# Patient Record
Sex: FEMALE | Race: WHITE | NOT HISPANIC OR LATINO | Employment: FULL TIME | URBAN - METROPOLITAN AREA
[De-identification: names, ages, dates, MRNs, and addresses within clinical notes are randomized per-mention and may not be internally consistent; named-entity substitution may affect disease eponyms.]

---

## 2017-02-10 ENCOUNTER — OFFICE VISIT (OUTPATIENT)
Dept: OPHTHALMOLOGY | Facility: CLINIC | Age: 52
End: 2017-02-10
Payer: COMMERCIAL

## 2017-02-10 DIAGNOSIS — H43.811 POSTERIOR VITREOUS DETACHMENT OF RIGHT EYE: ICD-10-CM

## 2017-02-10 DIAGNOSIS — Z01.01 ENCOUNTER FOR EXAMINATION OF EYES AND VISION WITH ABNORMAL FINDINGS: Primary | ICD-10-CM

## 2017-02-10 DIAGNOSIS — H52.4 PRESBYOPIA: ICD-10-CM

## 2017-02-10 PROBLEM — H04.122 DRY EYE OF LEFT SIDE: Status: ACTIVE | Noted: 2017-02-10

## 2017-02-10 PROCEDURE — 92004 COMPRE OPH EXAM NEW PT 1/>: CPT | Performed by: STUDENT IN AN ORGANIZED HEALTH CARE EDUCATION/TRAINING PROGRAM

## 2017-02-10 PROCEDURE — 92015 DETERMINE REFRACTIVE STATE: CPT | Performed by: STUDENT IN AN ORGANIZED HEALTH CARE EDUCATION/TRAINING PROGRAM

## 2017-02-10 ASSESSMENT — EXTERNAL EXAM - RIGHT EYE: OD_EXAM: NORMAL

## 2017-02-10 ASSESSMENT — REFRACTION_WEARINGRX
OD_SPHERE: -1.25
OS_CYLINDER: +1.25
SPECS_TYPE: SVL-DISTANCE
OD_AXIS: 180
OD_CYLINDER: +1.25
OS_SPHERE: -1.25
OS_AXIS: 180

## 2017-02-10 ASSESSMENT — CUP TO DISC RATIO
OD_RATIO: 0.1
OS_RATIO: 0.1

## 2017-02-10 ASSESSMENT — REFRACTION_MANIFEST
OS_SPHERE: -1.50
OD_CYLINDER: +1.25
OD_ADD: +1.50
OD_SPHERE: -1.50
OS_CYLINDER: +1.25
OS_AXIS: 005
OD_AXIS: 002
OS_ADD: +1.50

## 2017-02-10 ASSESSMENT — VISUAL ACUITY
OS_CC: 20/20
OD_CC: 20/20
METHOD: SNELLEN - LINEAR
CORRECTION_TYPE: GLASSES

## 2017-02-10 ASSESSMENT — TONOMETRY
IOP_METHOD: APPLANATION
OD_IOP_MMHG: 18
OS_IOP_MMHG: 18

## 2017-02-10 ASSESSMENT — SLIT LAMP EXAM - LIDS
COMMENTS: NORMAL
COMMENTS: NORMAL

## 2017-02-10 ASSESSMENT — CONF VISUAL FIELD
OS_NORMAL: 1
OD_NORMAL: 1

## 2017-02-10 ASSESSMENT — EXTERNAL EXAM - LEFT EYE: OS_EXAM: NORMAL

## 2017-02-10 NOTE — PROGRESS NOTES
Current Eye Medications:  None     Subjective:  Here for complete today.  Last eye exam was 2 years ago, wears distance glasses without bifocal.  Glasses are broken, has 1 large snouzer dog with 3 puppies.  Thinks she may be in need of bifocal, harder to read recently.      Objective:  See Ophthalmology Exam.       Assessment:  Kalani Sherwood is a 51 year old female who presents with:   Encounter Diagnoses   Name Primary?     Posterior vitreous detachment of right eye        Plan:  Glasses prescription given - optional     Melanie Goetz MD  [phone]

## 2017-02-10 NOTE — MR AVS SNAPSHOT
After Visit Summary   2/10/2017    Kalani Sherwood    MRN: [id number]           Patient Information     Date Of Birth          1965        Visit Information        Provider Department      2/10/2017 10:30 AM Melanie Goetz MD [facility]        Today's Diagnoses     Encounter for examination of eyes and vision with abnormal findings    -  1     Presbyopia         Posterior vitreous detachment of right eye         Dry eye of left side           Care Instructions    Glasses prescription given - optional     Melanie Goetz MD  [phone]          Follow-ups after your visit        Follow-up notes from your care team     Return in about 18 months (around 8/10/2018) for Complete Exam.      Who to contact     If you have questions or need follow up information about today's clinic visit or your schedule please contact [facility] directly at [phone].  Normal or non-critical lab and imaging results will be communicated to you by MyChart, letter or phone within 4 business days after the clinic has received the results. If you do not hear from us within 7 days, please contact the clinic through [employer] In[employer] or phone. If you have a critical or abnormal lab result, we will notify you by phone as soon as possible.  Submit refill requests through [employer] or call your pharmacy and they will forward the refill request to us. Please allow 3 business days for your refill to be completed.          Additional Information About Your Visit        MyChart Information     [employer] gives you secure access to your electronic health record. If you see a primary care provider, you can also send messages to your care team and make appointments. If you have questions, please call your primary care clinic.  If you do not have a primary care provider, please call [phone] and they will assist you.        Care EveryWhere ID     This is your Care EveryWhere ID. This could be used by  other organizations to access your [facility] medical records  [id number]         Blood Pressure from Last 3 Encounters:   10/21/16 121/84   06/28/12 150/100   02/24/12 128/80    Weight from Last 3 Encounters:   10/21/16 97.977 kg (216 lb)   06/28/12 88.996 kg (196 lb 3.2 oz)   02/24/12 87.091 kg (192 lb)              We Performed the Following     EYE EXAM (SIMPLE-NONBILLABLE)     REFRACTIVE STATUS        Primary Care Provider Office Phone # Fax #    Galilea Alexis Franco [phone]       [facility] [address]  [facility] [address]        Thank you!     Thank you for choosing [facility]  for your care. Our goal is always to provide you with excellent care. Hearing back from our patients is one way we can continue to improve our services. Please take a few minutes to complete the written survey that you may receive in the mail after your visit with us. Thank you!             Your Updated Medication List - Protect others around you: Learn how to safely use, store and throw away your medicines at www.disposemymeds.org.          This list is accurate as of: 2/10/17 11:39 AM.  Always use your most recent med list.                   Brand Name Dispense Instructions for use    amLODIPine 10 MG tablet    NORVASC    90 tablet    Take 1 tablet (10 mg) by mouth daily       GLUCOSAMINE COMPLEX PO      daily       hydrochlorothiazide 25 MG tablet    HYDRODIURIL    90 tablet    Take 1 tablet (25 mg) by mouth daily       venlafaxine 75 MG tablet    EFFEXOR    180 tablet    Take 1 tablet (75 mg) by mouth 2 times daily

## 2017-07-26 ENCOUNTER — OFFICE VISIT (OUTPATIENT)
Dept: URGENT CARE | Facility: URGENT CARE | Age: 52
End: 2017-07-26
Payer: COMMERCIAL

## 2017-07-26 VITALS
SYSTOLIC BLOOD PRESSURE: 138 MMHG | OXYGEN SATURATION: 95 % | TEMPERATURE: 98.6 F | HEART RATE: 86 BPM | DIASTOLIC BLOOD PRESSURE: 96 MMHG

## 2017-07-26 DIAGNOSIS — S61.411A LACERATION OF RIGHT HAND WITHOUT FOREIGN BODY, INITIAL ENCOUNTER: Primary | ICD-10-CM

## 2017-07-26 PROCEDURE — 12001 RPR S/N/AX/GEN/TRNK 2.5CM/<: CPT | Performed by: PHYSICIAN ASSISTANT

## 2017-07-26 NOTE — MR AVS SNAPSHOT
After Visit Summary   7/26/2017    Kalani Sherwood    MRN: [id number]           Patient Information     Date Of Birth          1965        Visit Information        Provider Department      7/26/2017 8:25 PM Harriet Varela PA-C [facility]        Today's Diagnoses     Laceration of right hand without foreign body, initial encounter    -  1       Follow-ups after your visit        Who to contact     If you have questions or need follow up information about today's clinic visit or your schedule please contact [facility] directly at [phone].  Normal or non-critical lab and imaging results will be communicated to you by [employer], letter or phone within 4 business days after the clinic has received the results. If you do not hear from us within 7 days, please contact the clinic through [employer] or phone. If you have a critical or abnormal lab result, we will notify you by phone as soon as possible.  Submit refill requests through Fara or call your pharmacy and they will forward the refill request to us. Please allow 3 business days for your refill to be completed.          Additional Information About Your Visit        MyChart Information     Fara gives you secure access to your electronic health record. If you see a primary care provider, you can also send messages to your care team and make appointments. If you have questions, please call your primary care clinic.  If you do not have a primary care provider, please call [phone] and they will assist you.        Care EveryWhere ID     This is your Care EveryWhere ID. This could be used by other organizations to access your [facility] medical records  [id number]        Your Vitals Were     Pulse Temperature Last Period Pulse Oximetry          86 98.6  F (37  C) (Oral) 09/12/2016 95%         Blood Pressure from Last 3 Encounters:   07/26/17 (!) 138/96   10/21/16 121/84   06/28/12 150/100     Weight from Last 3 Encounters:   10/21/16 216 lb (98 kg)   06/28/12 196 lb 3.2 oz (89 kg)   02/24/12 192 lb (87.1 kg)              We Performed the Following     CLOSURE SUPERFICIAL, WOUND DEHIS SIMPLE        Primary Care Provider Office Phone # Fax #    Galilea Alexis Franco [phone]       [facility] [address]  [facility] [place] [address]        Equal Access to Services     DANIELE PATHAK : Hadii aad ku hadasho Soomaali, waaxda luqadaha, qaybta kaalmada adeegyada, waxay idiin hayaan adeeg kharash la'pedron . So [facility] [phone].    ATENCIÓN: Si habla español, tiene a knutson disposición servicios gratuitos de asistencia lingüística. [facility] [phone].    We comply with applicable federal civil rights laws and [place] laws. We do not discriminate on the basis of race, color, national origin, age, disability sex, sexual orientation or gender identity.            Thank you!     Thank you for choosing [facility]  for your care. Our goal is always to provide you with excellent care. Hearing back from our patients is one way we can continue to improve our services. Please take a few minutes to complete the written survey that you may receive in the mail after your visit with us. Thank you!             Your Updated Medication List - Protect others around you: Learn how to safely use, store and throw away your medicines at www.disposemymeds.org.          This list is accurate as of: 7/26/17 10:43 PM.  Always use your most recent med list.                   Brand Name Dispense Instructions for use Diagnosis    amLODIPine 10 MG tablet    NORVASC    90 tablet    Take 1 tablet (10 mg) by mouth daily    Essential hypertension with goal blood pressure less than 130/80       GLUCOSAMINE COMPLEX PO      daily        hydrochlorothiazide 25 MG tablet    HYDRODIURIL    90 tablet    Take 1 tablet (25 mg) by mouth daily    Essential hypertension with goal blood pressure less than  130/80       venlafaxine 75 MG tablet    EFFEXOR    180 tablet    Take 1 tablet (75 mg) by mouth 2 times daily    Major depressive disorder, recurrent episode, mild (H)

## 2017-07-27 NOTE — NURSING NOTE
"Chief Complaint   Patient presents with     Laceration     today doing the dishes       Initial BP (!) 138/96 (BP Location: Right arm, Patient Position: Chair, Cuff Size: Adult Regular)  Pulse 86  Temp 98.6  F (37  C) (Oral)  LMP 09/12/2016  SpO2 95% Estimated body mass index is 36.79 kg/(m^2) as calculated from the following:    Height as of 10/21/16: 5' 4.25\" (1.632 m).    Weight as of 10/21/16: 216 lb (98 kg).  Medication Reconciliation: esteban Zuñiga CMA      "

## 2017-07-27 NOTE — PROGRESS NOTES
SUBJECTIVE:                                                    Kalani Sherwood is a 52 year old female who presents to clinic today for the following health issues:      laceration      Duration: today    Description (location/character/radiation): left hand     Intensity:  moderate    Accompanying signs and symptoms: none    History (similar episodes/previous evaluation): None    Precipitating or alleviating factors: None    Therapies tried and outcome: None         SUBJECTIVE:     Chief Complaint   Patient presents with     Laceration     today doing the dishes     Kalani was doing dishes, reached in the sink and it went under a knife which cut her finger.   No concern for a foreign body  No numbness, no weakness in the fingers  This happened less than an hour ago    Last tetanus booster within 10 years: yes    EXAM:   The patient appears today in no apparent distress  VITALS: BP (!) 138/96 (BP Location: Right arm, Patient Position: Chair, Cuff Size: Adult Regular)  Pulse 86  Temp 98.6  F (37  C) (Oral)  LMP 09/12/2016  SpO2 95%    Size of laceration: 2 centimeters  Characteristics of the laceration: slightly irregular edges, clean cut wound  Depth of laceration: subcutaneous tissue  Tendon function intact: yes  Sensation to light touch intact: yes  Pulses/capillary refill intact: yes  Picture included in patient's chart: no    Assessment:  1. Laceration of right hand without foreign body, initial encounter  - CLOSURE SUPERFICIAL, WOUND DEHIS SIMPLE      PLAN:  Tdap administered today: no   Laceration was repaired as described below.   Patient tolerated the procedure well.    After care instructions:  Keep wound clean and dry for the next 24-48 hours  Ok to shower and get wound wet after 48 hours, but do not soak for 2 weeks  Sutures out in 7 days  Signs of infection discussed today  May return to work/school as long as wound is kept clean and dry  Discussed the probability of scarring  Active range of  motion exercises encouraged    PROCEDURE NOTE:  Wound was locally injected with 4 cc's of Lidocaine 1% plain  Prepped and draped in the usual sterile fashion  Wound irrigated with normal saline using a 60cc syringe and irrigation tip for high-pressure irrigation  Wound was explored for any foreign bodies and evaluated for tendon, nerve, vessel or joint involvement.    Laceration was closed using 4 4-0 nylon interrupted sutures  Bandage was applied.     Harriet Varela PA-C

## 2017-09-07 ENCOUNTER — TRANSFERRED RECORDS (OUTPATIENT)
Dept: HEALTH INFORMATION MANAGEMENT | Facility: CLINIC | Age: 52
End: 2017-09-07

## 2017-10-15 ENCOUNTER — HEALTH MAINTENANCE LETTER (OUTPATIENT)
Age: 52
End: 2017-10-15

## 2017-10-16 ENCOUNTER — TELEPHONE (OUTPATIENT)
Dept: FAMILY MEDICINE | Facility: CLINIC | Age: 52
End: 2017-10-16

## 2017-10-16 DIAGNOSIS — F33.0 MAJOR DEPRESSIVE DISORDER, RECURRENT EPISODE, MILD (H): ICD-10-CM

## 2017-10-16 DIAGNOSIS — I10 ESSENTIAL HYPERTENSION WITH GOAL BLOOD PRESSURE LESS THAN 130/80: ICD-10-CM

## 2017-10-16 RX ORDER — VENLAFAXINE 75 MG/1
TABLET ORAL
Qty: 60 TABLET | Refills: 3 | Status: SHIPPED | OUTPATIENT
Start: 2017-10-16 | End: 2023-01-24

## 2017-10-16 RX ORDER — HYDROCHLOROTHIAZIDE 25 MG/1
TABLET ORAL
Qty: 30 TABLET | Refills: 0 | Status: SHIPPED | OUTPATIENT
Start: 2017-10-16 | End: 2017-11-19

## 2017-10-16 RX ORDER — AMLODIPINE BESYLATE 10 MG/1
TABLET ORAL
Qty: 30 TABLET | Refills: 0 | Status: SHIPPED | OUTPATIENT
Start: 2017-10-16 | End: 2017-11-19

## 2017-10-16 NOTE — TELEPHONE ENCOUNTER
Medications are being filled for 1 time refill only due to:  Patient needs to be seen because due for annual visit in 5 days.     Encounter routed to team to reach out to patient to schedule.      Keiko Soto RN  [facility]

## 2017-10-16 NOTE — TELEPHONE ENCOUNTER
venlafaxine (EFFEXOR) 75 MG tablet    Last Written Prescription Date: 10-21-16  Last Fill Quantity: 180, # refills: 3  Last Office Visit with [facility] or [facility] prescribing provider: 10-21-16        BP Readings from Last 3 Encounters:   07/26/17 (!) 138/96   10/21/16 121/84   06/28/12 150/100     Pulse: (for Fetzima)  Creatinine   Date Value Ref Range Status   10/21/2016 0.66 0.52 - 1.04 mg/dL Final   ]    Last PHQ-9 score on record=   PHQ-9 SCORE 10/21/2016   Total Score -   Total Score 2       amLODIPine (NORVASC) 10 MG tablet      Last Written Prescription Date: 10-21-16  Last Fill Quantity: 90, # refills: 3    Last Office Visit with [facility], [facility] or [facility] prescribing provider:  10-21-16   Future Office Visit:        BP Readings from Last 3 Encounters:   07/26/17 (!) 138/96   10/21/16 121/84   06/28/12 150/100     hydrochlorothiazide (HYDRODIURIL) 25 MG tablet      Last Written Prescription Date: 10-21-16  Last Fill Quantity: 90, # refills: 3  Last Office Visit with [facility], [facility] or [facility] prescribing provider: 10-21-16       Potassium   Date Value Ref Range Status   10/21/2016 4.0 3.4 - 5.3 mmol/L Final     Creatinine   Date Value Ref Range Status   10/21/2016 0.66 0.52 - 1.04 mg/dL Final     BP Readings from Last 3 Encounters:   07/26/17 (!) 138/96   10/21/16 121/84   06/28/12 150/100

## 2017-11-19 DIAGNOSIS — I10 ESSENTIAL HYPERTENSION WITH GOAL BLOOD PRESSURE LESS THAN 130/80: ICD-10-CM

## 2017-11-21 NOTE — TELEPHONE ENCOUNTER
HCTZ      Last Written Prescription Date: 10/16/17  Last Fill Quantity: 30,  # refills: 0   Last Office Visit with [facility] or [facility] prescribing provider: 10/21/16    amlodipine      Last Written Prescription Date: 10/16/17  Last Fill Quantity: 30,  # refills: 0   Last Office Visit with [facility], [facility] or [facility] prescribing provider: 10/21/16            There is documentation that TC spoke to patient and advised visit needed, patient stated she would call back to schedule, has not been done      Requested Prescriptions   Pending Prescriptions Disp Refills     hydrochlorothiazide (HYDRODIURIL) 25 MG tablet [Pharmacy Med Name: HYDROCHLOROTHIAZIDE 25 MG TAB] 30 tablet 0     Sig: TAKE 1 TABLET (25 MG) BY MOUTH DAILY    Diuretics (Including Combos) Protocol Failed    11/19/2017  7:26 AM       Failed - Blood pressure under 140/90    BP Readings from Last 3 Encounters:   07/26/17 (!) 138/96   10/21/16 121/84   06/28/12 150/100                Failed - Recent or future visit with authorizing provider's specialty    Patient had office visit in the last year or has a visit in the next 30 days with authorizing provider.  See chart review.              Failed - Normal serum creatinine on file in past 12 months    Recent Labs   Lab Test  10/21/16   0930   CR  0.66             Failed - Normal serum potassium on file in past 12 months    Recent Labs   Lab Test  10/21/16   0930   POTASSIUM  4.0                   Failed - Normal serum sodium on file in past 12 months    Recent Labs   Lab Test  10/21/16   0930   NA  142             Passed - Patient is age 18 or older       Passed - No active pregancy on record       Passed - No positive pregnancy test in past 12 months        amLODIPine (NORVASC) 10 MG tablet [Pharmacy Med Name: AMLODIPINE BESYLATE 10 MG TAB] 30 tablet 0     Sig: TAKE 1 TABLET (10 MG) BY MOUTH DAILY    Calcium Channel Blockers Protocol  Failed    11/19/2017  7:26 AM       Failed - Blood pressure under 140/90    BP  Readings from Last 3 Encounters:   07/26/17 (!) 138/96   10/21/16 121/84   06/28/12 150/100                Failed - Recent or future visit with authorizing provider    Patient had office visit in the last year or has a visit in the next 30 days with authorizing provider.  See chart review.              Failed - Normal serum creatinine on file in past 12 months    Recent Labs   Lab Test  10/21/16   0930   CR  0.66            Passed - Patient is age 18 or older       Passed - No active pregnancy on record       Passed - No positive pregnancy test in past 12 months        Routing refill request to provider for review/approval because:  Ju given x1 and patient did not follow up, please advise    Keiko Soto, JERED  [facility]

## 2017-11-22 RX ORDER — HYDROCHLOROTHIAZIDE 25 MG/1
TABLET ORAL
Qty: 15 TABLET | Refills: 0 | Status: SHIPPED | OUTPATIENT
Start: 2017-11-22 | End: 2023-01-24

## 2017-11-22 RX ORDER — AMLODIPINE BESYLATE 10 MG/1
TABLET ORAL
Qty: 15 TABLET | Refills: 0 | Status: SHIPPED | OUTPATIENT
Start: 2017-11-22 | End: 2023-01-24

## 2017-12-21 ENCOUNTER — TELEPHONE (OUTPATIENT)
Dept: FAMILY MEDICINE | Facility: CLINIC | Age: 52
End: 2017-12-21

## 2017-12-21 NOTE — TELEPHONE ENCOUNTER
12/21/2017    Call Regarding Preventive Health Screening Colonoscopy, Mammogram and Cervical/PAP    Attempt 1    Message on voicemail     Comments:       Outreach   CC

## 2017-12-26 NOTE — TELEPHONE ENCOUNTER
The Patient declined Preventive Health Screens for: Mammogram, Pap smear and colonoscopy.  Please review chart and follow-up with patient if needed.    Thank you        Outreach ,  Arlene Espana

## 2018-01-17 ENCOUNTER — TELEPHONE (OUTPATIENT)
Dept: FAMILY MEDICINE | Facility: CLINIC | Age: 53
End: 2018-01-17

## 2018-01-17 NOTE — TELEPHONE ENCOUNTER
.  Panel Management Review      Patient has the following on her problem list:     Depression / Dysthymia review    Measure:  Needs PHQ-9 score of 4 or less during index window.  Administer PHQ-9 and if score is 5 or more, send encounter to provider for next steps.    5 - 7 month window range:     PHQ-9 SCORE 11/28/2011 2/24/2012 10/21/2016   Total Score 0 1 -   Total Score - - 2       If PHQ-9 recheck is 5 or more, route to provider for next steps.    Patient is due for:  PHQ9 and DAP        Composite cancer screening  Chart review shows that this patient is due/due soon for the following Pap Smear, Mammogram and Colonoscopy  Summary:    Patient is due/failing the following:   COLONOSCOPY, MAMMOGRAM and PAP    Action needed:   Patient needs office visit for pap smear.    Type of outreach:    patien declined-pap smear,colonoscopy and mammogram     Questions for provider review:    None                                                                                                                                    Rissa Leonard MA       Chart routed to Care Team .

## 2020-02-24 ENCOUNTER — HEALTH MAINTENANCE LETTER (OUTPATIENT)
Age: 55
End: 2020-02-24

## 2020-12-13 ENCOUNTER — HEALTH MAINTENANCE LETTER (OUTPATIENT)
Age: 55
End: 2020-12-13

## 2021-04-17 ENCOUNTER — HEALTH MAINTENANCE LETTER (OUTPATIENT)
Age: 56
End: 2021-04-17

## 2021-09-26 ENCOUNTER — HEALTH MAINTENANCE LETTER (OUTPATIENT)
Age: 56
End: 2021-09-26

## 2022-05-08 ENCOUNTER — HEALTH MAINTENANCE LETTER (OUTPATIENT)
Age: 57
End: 2022-05-08

## 2023-01-24 ENCOUNTER — OFFICE VISIT (OUTPATIENT)
Dept: FAMILY MEDICINE | Facility: CLINIC | Age: 58
End: 2023-01-24
Payer: COMMERCIAL

## 2023-01-24 VITALS
BODY MASS INDEX: 32.03 KG/M2 | SYSTOLIC BLOOD PRESSURE: 183 MMHG | WEIGHT: 187.6 LBS | TEMPERATURE: 97.6 F | DIASTOLIC BLOOD PRESSURE: 121 MMHG | RESPIRATION RATE: 16 BRPM | HEIGHT: 64 IN | OXYGEN SATURATION: 97 % | HEART RATE: 72 BPM

## 2023-01-24 DIAGNOSIS — Z13.220 SCREENING FOR HYPERLIPIDEMIA: ICD-10-CM

## 2023-01-24 DIAGNOSIS — Z11.4 SCREENING FOR HIV (HUMAN IMMUNODEFICIENCY VIRUS): ICD-10-CM

## 2023-01-24 DIAGNOSIS — Z12.11 SCREEN FOR COLON CANCER: ICD-10-CM

## 2023-01-24 DIAGNOSIS — Z12.4 CERVICAL CANCER SCREENING: ICD-10-CM

## 2023-01-24 DIAGNOSIS — Z11.59 NEED FOR HEPATITIS C SCREENING TEST: ICD-10-CM

## 2023-01-24 DIAGNOSIS — Z12.31 VISIT FOR SCREENING MAMMOGRAM: ICD-10-CM

## 2023-01-24 DIAGNOSIS — R73.09 ELEVATED GLUCOSE: ICD-10-CM

## 2023-01-24 DIAGNOSIS — Z00.00 ROUTINE GENERAL MEDICAL EXAMINATION AT A HEALTH CARE FACILITY: Primary | ICD-10-CM

## 2023-01-24 DIAGNOSIS — I10 ESSENTIAL HYPERTENSION WITH GOAL BLOOD PRESSURE LESS THAN 130/80: ICD-10-CM

## 2023-01-24 DIAGNOSIS — R32 URINARY INCONTINENCE, UNSPECIFIED TYPE: ICD-10-CM

## 2023-01-24 DIAGNOSIS — F41.9 ANXIETY: ICD-10-CM

## 2023-01-24 DIAGNOSIS — F33.0 MAJOR DEPRESSIVE DISORDER, RECURRENT EPISODE, MILD (H): ICD-10-CM

## 2023-01-24 LAB — HBA1C MFR BLD: 5.5 % (ref 0–5.6)

## 2023-01-24 PROCEDURE — 83036 HEMOGLOBIN GLYCOSYLATED A1C: CPT | Performed by: PHYSICIAN ASSISTANT

## 2023-01-24 PROCEDURE — 99386 PREV VISIT NEW AGE 40-64: CPT | Mod: 25 | Performed by: PHYSICIAN ASSISTANT

## 2023-01-24 PROCEDURE — 87389 HIV-1 AG W/HIV-1&-2 AB AG IA: CPT | Performed by: PHYSICIAN ASSISTANT

## 2023-01-24 PROCEDURE — 90471 IMMUNIZATION ADMIN: CPT | Performed by: PHYSICIAN ASSISTANT

## 2023-01-24 PROCEDURE — 80061 LIPID PANEL: CPT | Performed by: PHYSICIAN ASSISTANT

## 2023-01-24 PROCEDURE — 80048 BASIC METABOLIC PNL TOTAL CA: CPT | Performed by: PHYSICIAN ASSISTANT

## 2023-01-24 PROCEDURE — 36415 COLL VENOUS BLD VENIPUNCTURE: CPT | Performed by: PHYSICIAN ASSISTANT

## 2023-01-24 PROCEDURE — 86803 HEPATITIS C AB TEST: CPT | Performed by: PHYSICIAN ASSISTANT

## 2023-01-24 PROCEDURE — 90682 RIV4 VACC RECOMBINANT DNA IM: CPT | Performed by: PHYSICIAN ASSISTANT

## 2023-01-24 PROCEDURE — 99214 OFFICE O/P EST MOD 30 MIN: CPT | Mod: 25 | Performed by: PHYSICIAN ASSISTANT

## 2023-01-24 RX ORDER — AMLODIPINE BESYLATE 10 MG/1
10 TABLET ORAL DAILY
Qty: 90 TABLET | Refills: 0 | Status: SHIPPED | OUTPATIENT
Start: 2023-01-24 | End: 2023-04-21

## 2023-01-24 RX ORDER — VENLAFAXINE 75 MG/1
150 TABLET ORAL 2 TIMES DAILY
Qty: 360 TABLET | Refills: 0 | Status: SHIPPED | OUTPATIENT
Start: 2023-01-24 | End: 2023-03-24

## 2023-01-24 RX ORDER — OXYBUTYNIN CHLORIDE 15 MG/1
1 TABLET, EXTENDED RELEASE ORAL DAILY
COMMUNITY
Start: 2022-10-06 | End: 2023-02-06

## 2023-01-24 RX ORDER — CARVEDILOL 3.12 MG/1
1 TABLET ORAL 2 TIMES DAILY
COMMUNITY
Start: 2022-06-22 | End: 2023-01-24

## 2023-01-24 RX ORDER — TOLTERODINE 4 MG/1
4 CAPSULE, EXTENDED RELEASE ORAL DAILY
Qty: 90 CAPSULE | Refills: 0 | Status: SHIPPED | OUTPATIENT
Start: 2023-01-24 | End: 2023-02-07

## 2023-01-24 RX ORDER — CARVEDILOL 3.12 MG/1
3.12 TABLET ORAL 2 TIMES DAILY
Qty: 180 TABLET | Refills: 0 | Status: SHIPPED | OUTPATIENT
Start: 2023-01-24 | End: 2023-04-21

## 2023-01-24 RX ORDER — VENLAFAXINE 75 MG/1
150 TABLET ORAL 2 TIMES DAILY
COMMUNITY
Start: 2022-03-22 | End: 2023-01-24

## 2023-01-24 RX ORDER — VENLAFAXINE 75 MG/1
75 TABLET ORAL 2 TIMES DAILY
Qty: 60 TABLET | Refills: 3 | Status: CANCELLED | OUTPATIENT
Start: 2023-01-24

## 2023-01-24 RX ORDER — HYDROCHLOROTHIAZIDE 25 MG/1
25 TABLET ORAL DAILY
Qty: 90 TABLET | Refills: 0 | Status: SHIPPED | OUTPATIENT
Start: 2023-01-24 | End: 2023-04-21

## 2023-01-24 RX ORDER — HYDROCHLOROTHIAZIDE 25 MG/1
1 TABLET ORAL DAILY
COMMUNITY
Start: 2022-03-22 | End: 2023-02-27

## 2023-01-24 RX ORDER — AMLODIPINE BESYLATE 10 MG/1
1 TABLET ORAL DAILY
COMMUNITY
Start: 2022-03-22 | End: 2023-01-24

## 2023-01-24 ASSESSMENT — PATIENT HEALTH QUESTIONNAIRE - PHQ9
10. IF YOU CHECKED OFF ANY PROBLEMS, HOW DIFFICULT HAVE THESE PROBLEMS MADE IT FOR YOU TO DO YOUR WORK, TAKE CARE OF THINGS AT HOME, OR GET ALONG WITH OTHER PEOPLE: VERY DIFFICULT
SUM OF ALL RESPONSES TO PHQ QUESTIONS 1-9: 14
SUM OF ALL RESPONSES TO PHQ QUESTIONS 1-9: 14

## 2023-01-24 ASSESSMENT — PAIN SCALES - GENERAL: PAINLEVEL: MILD PAIN (3)

## 2023-01-24 NOTE — PROGRESS NOTES
SUBJECTIVE:   CC: Kalani is an 57 year old who presents for preventive health visit.     Patient has been advised of split billing requirements and indicates understanding: Yes  History of Present Illness       Reason for visit:  New pt. Annual physical. Renew meds. Uncontrolled high blood pr. Anxiety/depression,  lump on back.    She eats 2-3 servings of fruits and vegetables daily.She consumes 2 sweetened beverage(s) daily.She exercises with enough effort to increase her heart rate 9 or less minutes per day.  She exercises with enough effort to increase her heart rate 3 or less days per week. She is missing 2 dose(s) of medications per week.  She is not taking prescribed medications regularly due to remembering to take.    Today's PHQ-9         PHQ-9 Total Score: 14    PHQ-9 Q9 Thoughts of better off dead/self-harm past 2 weeks :   Not at all    How difficult have these problems made it for you to do your work, take care of things at home, or get along with other people: Very difficult      HTN- has been running high for last 2 months.     Anxiety- not well controlled. Zoloft stopped working, cymbalta. Has never seen a psychiatrist.   Has never had an as needed anxiety meds.     Lump on the back, smaller. Itches. Can open at times and be painful.         Today's PHQ-2 Score:   PHQ-2 (  [employer]) 2012   Q1: Little interest or pleasure in doing things 0   Q2: Feeling down, depressed or hopeless 0   PHQ-2 Score 0       Have you ever done Advance Care Planning? (For example, a Health Directive, POLST, or a discussion with a medical provider or your loved ones about your wishes): No, advance care planning information given to patient to review.  Patient plans to discuss their wishes with loved ones or provider.      Social History     Tobacco Use     Smoking status: Former     Types: Cigarettes     Quit date: 10/2/2004     Years since quittin.3     Smokeless tobacco: Never   Substance Use Topics      Alcohol use: Yes     Comment: occ.         Alcohol Use 10/21/2016   Prescreen: >3 drinks/day or >7 drinks/week? The patient does not drink >3 drinks per day nor >7 drinks per week.       Reviewed orders with patient.  Reviewed health maintenance and updated orders accordingly - Yes  Lab work is in process    Breast Cancer Screening:    FHS-7:   Breast CA Risk Assessment (FHS-7) 1/24/2023   Did any of your first-degree relatives have breast or ovarian cancer? No   Did any of your relatives have bilateral breast cancer? Yes   Did any man in your family have breast cancer? No   Did any woman in your family have breast and ovarian cancer? Yes   Did any woman in your family have breast cancer before age 50 y? No   Do you have 2 or more relatives with breast and/or ovarian cancer? Yes   Do you have 2 or more relatives with breast and/or bowel cancer? Yes     2 paternal aunts. Maternal aunt with ovarian cancer.       Mammogram Screening: Recommended mammography every 1-2 years with patient discussion and risk factor consideration  Pertinent mammograms are reviewed under the imaging tab.    History of abnormal Pap smear: NO - age 30-65 PAP every 5 years with negative HPV co-testing recommended  PAP / HPV Latest Ref Rng & Units 6/1/2014 2/11/2011   PAP (Historical) Negative Negative NIL     Reviewed and updated as needed this visit by clinical staff   Tobacco  Allergies  Meds  Problems  Med Hx  Surg Hx  Fam Hx          Reviewed and updated as needed this visit by Provider   Tobacco  Allergies  Meds  Problems  Med Hx  Surg Hx  Fam Hx             Review of Systems  CONSTITUTIONAL: NEGATIVE for fever, chills, change in weight  INTEGUMENTARU/SKIN: NEGATIVE for worrisome rashes, moles or lesions  EYES: NEGATIVE for vision changes or irritation  ENT: NEGATIVE for ear, mouth and throat problems  RESP: NEGATIVE for significant cough or SOB  BREAST: NEGATIVE for masses, tenderness or discharge  CV: NEGATIVE for chest  "pain, palpitations or peripheral edema  GI: NEGATIVE for nausea, abdominal pain, heartburn, or change in bowel habits  : NEGATIVE for unusual urinary or vaginal symptoms. Periods are regular.  MUSCULOSKELETAL: NEGATIVE for significant arthralgias or myalgia  NEURO: NEGATIVE for weakness, dizziness or paresthesias  PSYCHIATRIC: NEGATIVE for changes in mood or affect     OBJECTIVE:   BP (!) 183/121 (BP Location: Right arm, Patient Position: Sitting, Cuff Size: Adult Regular)   Pulse 72   Temp 97.6  F (36.4  C) (Oral)   Resp 16   Ht 1.62 m (5' 3.78\")   Wt 85.1 kg (187 lb 9.6 oz)   LMP 09/12/2016   SpO2 97%   BMI 32.42 kg/m    Physical Exam  GENERAL: healthy, alert and no distress  EYES: Eyes grossly normal to inspection, PERRL and conjunctivae and sclerae normal  HENT: ear canals and TM's normal, nose and mouth without ulcers or lesions  NECK: no adenopathy, no asymmetry, masses, or scars and thyroid normal to palpation  RESP: lungs clear to auscultation - no rales, rhonchi or wheezes  CV: regular rate and rhythm, normal S1 S2, no S3 or S4, no murmur, click or rub, no peripheral edema   ABDOMEN: soft, nontender, no hepatosplenomegaly, no masses and bowel sounds normal  MS: no gross musculoskeletal defects noted, no edema  SKIN: no suspicious lesions or rashes  NEURO: Normal strength and tone, mentation intact and speech normal  PSYCH: mentation appears normal, affect normal/bright        ASSESSMENT/PLAN:   (Z00.00) Routine general medical examination at a health care facility  (primary encounter diagnosis)  Comment:   Plan: Lipid panel reflex to direct LDL Non-fasting            (Z12.11) Screen for colon cancer  Comment:   Plan: Colonoscopy Screening  Referral            (Z11.4) Screening for HIV (human immunodeficiency virus)  Comment:   Plan: HIV Antigen Antibody Combo            (Z11.59) Need for hepatitis C screening test  Comment: Plan: Hepatitis C Screen Reflex to HCV RNA Quant and         " Genotype            (Z12.31) Visit for screening mammogram  Comment:   Plan: MA SCREENING DIGITAL BILAT - Future  (s+30)            (Z12.4) Cervical cancer screening  Comment:   Plan:       (I10) Essential hypertension with goal blood pressure less than 130/80  Comment: Not on medication. Has been out for 2 months.   Plan: amLODIPine (NORVASC) 10 MG tablet,         hydrochlorothiazide (HYDRODIURIL) 25 MG tablet,        Basic metabolic panel, carvedilol (COREG) 3.125        MG tablet        Refilled and labs today.     (F33.0) Major depressive disorder, recurrent episode, mild (H)  Comment:   Plan: Adult Mental Health  Referral,         venlafaxine (EFFEXOR) 75 MG tablet        On high doses of effexor and not well controlled. Psych consult for help controling.     (R73.09) Elevated glucose  Comment:   Plan: Hemoglobin A1c           (F41.9) Anxiety  Comment:   Plan: Adult Mental Health  Referral        As above.     (R32) Urinary incontinence, unspecified type  Comment:   Plan: tolterodine ER (DETROL LA) 4 MG 24 hr capsule,         Adult Urology  Referral        Not well controlled on oxybutynin, change and see urology as needed.             COUNSELING:  Reviewed preventive health counseling, as reflected in patient instructions       Regular exercise       Healthy diet/nutrition       Consider Hep C screening for all patients one time for ages 18-79 years       HIV screeninx in teen years, 1x in adult years, and at intervals if high risk        She reports that she quit smoking about 18 years ago. Her smoking use included cigarettes. She has never used smokeless tobacco.          Brooklyn Cam PA-C  [facility]

## 2023-01-24 NOTE — PATIENT INSTRUCTIONS
Mammogram- schedule through [facility] or [phone]    Schedule colonoscopy- they will call you.     Stop oxybutinin, start Detrol, we will recheck in 1 month.     Psychiatry will call to schedule you.

## 2023-01-25 LAB
ANION GAP SERPL CALCULATED.3IONS-SCNC: 5 MMOL/L (ref 3–14)
BUN SERPL-MCNC: 9 MG/DL (ref 7–30)
CALCIUM SERPL-MCNC: 8.9 MG/DL (ref 8.5–10.1)
CHLORIDE BLD-SCNC: 110 MMOL/L (ref 94–109)
CHOLEST SERPL-MCNC: 240 MG/DL
CO2 SERPL-SCNC: 28 MMOL/L (ref 20–32)
CREAT SERPL-MCNC: 0.52 MG/DL (ref 0.52–1.04)
FASTING STATUS PATIENT QL REPORTED: ABNORMAL
GFR SERPL CREATININE-BSD FRML MDRD: >90 ML/MIN/1.73M2
GLUCOSE BLD-MCNC: 102 MG/DL (ref 70–99)
HCV AB SERPL QL IA: NONREACTIVE
HDLC SERPL-MCNC: 65 MG/DL
HIV 1+2 AB+HIV1 P24 AG SERPL QL IA: NONREACTIVE
LDLC SERPL CALC-MCNC: 143 MG/DL
NONHDLC SERPL-MCNC: 175 MG/DL
POTASSIUM BLD-SCNC: 3.5 MMOL/L (ref 3.4–5.3)
SODIUM SERPL-SCNC: 143 MMOL/L (ref 133–144)
TRIGL SERPL-MCNC: 158 MG/DL

## 2023-01-25 NOTE — RESULT ENCOUNTER NOTE
The 10-year ASCVD risk score (Iftikhar VOSS, et al., 2019) is: 5.6%    Values used to calculate the score:      Age: 57 years      Sex: Female      Is Non- : No      Diabetic: No      Tobacco smoker: No      Systolic Blood Pressure: 183 mmHg      Is BP treated: Yes      HDL Cholesterol: 65 mg/dL      Total Cholesterol: 240 mg/dL    Hi Kalani,   Your labs are stable. No concerning findings.   Brooklyn Cam PA-C

## 2023-02-06 ENCOUNTER — MYC MEDICAL ADVICE (OUTPATIENT)
Dept: FAMILY MEDICINE | Facility: CLINIC | Age: 58
End: 2023-02-06
Payer: COMMERCIAL

## 2023-02-06 DIAGNOSIS — R32 URINARY INCONTINENCE, UNSPECIFIED TYPE: Primary | ICD-10-CM

## 2023-02-07 RX ORDER — OXYBUTYNIN CHLORIDE 15 MG/1
15 TABLET, EXTENDED RELEASE ORAL DAILY
Qty: 90 TABLET | Refills: 1 | Status: SHIPPED | OUTPATIENT
Start: 2023-02-07 | End: 2023-08-14

## 2023-02-26 ASSESSMENT — ANXIETY QUESTIONNAIRES
IF YOU CHECKED OFF ANY PROBLEMS ON THIS QUESTIONNAIRE, HOW DIFFICULT HAVE THESE PROBLEMS MADE IT FOR YOU TO DO YOUR WORK, TAKE CARE OF THINGS AT HOME, OR GET ALONG WITH OTHER PEOPLE: SOMEWHAT DIFFICULT
GAD7 TOTAL SCORE: 6
8. IF YOU CHECKED OFF ANY PROBLEMS, HOW DIFFICULT HAVE THESE MADE IT FOR YOU TO DO YOUR WORK, TAKE CARE OF THINGS AT HOME, OR GET ALONG WITH OTHER PEOPLE?: SOMEWHAT DIFFICULT
3. WORRYING TOO MUCH ABOUT DIFFERENT THINGS: SEVERAL DAYS
6. BECOMING EASILY ANNOYED OR IRRITABLE: MORE THAN HALF THE DAYS
4. TROUBLE RELAXING: SEVERAL DAYS
GAD7 TOTAL SCORE: 6
7. FEELING AFRAID AS IF SOMETHING AWFUL MIGHT HAPPEN: NOT AT ALL
2. NOT BEING ABLE TO STOP OR CONTROL WORRYING: NOT AT ALL
GAD7 TOTAL SCORE: 6
1. FEELING NERVOUS, ANXIOUS, OR ON EDGE: MORE THAN HALF THE DAYS
7. FEELING AFRAID AS IF SOMETHING AWFUL MIGHT HAPPEN: NOT AT ALL
5. BEING SO RESTLESS THAT IT IS HARD TO SIT STILL: NOT AT ALL

## 2023-02-26 ASSESSMENT — PATIENT HEALTH QUESTIONNAIRE - PHQ9
SUM OF ALL RESPONSES TO PHQ QUESTIONS 1-9: 15
SUM OF ALL RESPONSES TO PHQ QUESTIONS 1-9: 15
10. IF YOU CHECKED OFF ANY PROBLEMS, HOW DIFFICULT HAVE THESE PROBLEMS MADE IT FOR YOU TO DO YOUR WORK, TAKE CARE OF THINGS AT HOME, OR GET ALONG WITH OTHER PEOPLE: EXTREMELY DIFFICULT

## 2023-02-27 ENCOUNTER — ANCILLARY ORDERS (OUTPATIENT)
Dept: RADIOLOGY | Facility: CLINIC | Age: 58
End: 2023-02-27

## 2023-02-27 ENCOUNTER — ANCILLARY PROCEDURE (OUTPATIENT)
Dept: MAMMOGRAPHY | Facility: CLINIC | Age: 58
End: 2023-02-27
Attending: PHYSICIAN ASSISTANT
Payer: COMMERCIAL

## 2023-02-27 ENCOUNTER — VIRTUAL VISIT (OUTPATIENT)
Dept: PSYCHIATRY | Facility: CLINIC | Age: 58
End: 2023-02-27
Payer: COMMERCIAL

## 2023-02-27 ENCOUNTER — TELEPHONE (OUTPATIENT)
Dept: PSYCHIATRY | Facility: CLINIC | Age: 58
End: 2023-02-27

## 2023-02-27 DIAGNOSIS — Z12.31 VISIT FOR SCREENING MAMMOGRAM: ICD-10-CM

## 2023-02-27 DIAGNOSIS — F33.0 MAJOR DEPRESSIVE DISORDER, RECURRENT EPISODE, MILD (H): ICD-10-CM

## 2023-02-27 DIAGNOSIS — F41.9 ANXIETY: ICD-10-CM

## 2023-02-27 DIAGNOSIS — F90.9 ATTENTION DEFICIT HYPERACTIVITY DISORDER (ADHD), UNSPECIFIED ADHD TYPE: Primary | ICD-10-CM

## 2023-02-27 PROCEDURE — 99204 OFFICE O/P NEW MOD 45 MIN: CPT | Mod: VID | Performed by: PSYCHIATRY & NEUROLOGY

## 2023-02-27 PROCEDURE — 77067 SCR MAMMO BI INCL CAD: CPT | Mod: TC | Performed by: RADIOLOGY

## 2023-02-27 RX ORDER — GUANFACINE 1 MG/1
1 TABLET, EXTENDED RELEASE ORAL AT BEDTIME
Qty: 30 TABLET | Refills: 1 | Status: SHIPPED | OUTPATIENT
Start: 2023-02-27 | End: 2023-03-24
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
## 2023-02-27 NOTE — Clinical Note
Brooklyn,  Thank you for your consult and care of the patient.  I started Intuniv which has mild antihypertensive effects, though we may need to titrate it more.  Please let me know if you have any issues with it.  There are no direct interactions per interaction  aside from the blood pressure effects.  We will likely cross taper venlafaxine to an SSRI to further reduce any potential blood pressure/hypertensive contributions.  Sincerely, Home Coughlin M.D. Consultative Psychiatrist Program Medical Director, [facility]

## 2023-02-27 NOTE — PROGRESS NOTES
Video-Visit Details    Type of service:  Video Visit    Video Start Time (time video started): ***    Video End Time (time video stopped): ***    Originating Location (pt. Location): Home    {PROVIDER LOCATION On-site should be selected for visits conducted from your clinic location or adjoining [facility] hospital, academic office, or other nearby [facility] building. Off-site should be selected for all other provider locations, including home:[id number]}    Distant Location (provider location):  {virtual location provider:[id number]}    Mode of Communication:  Video Conference via [employer]        Answers for HPI/ROS submitted by the patient on 2/26/2023  If you checked off any problems, how difficult have these problems made it for you to do your work, take care of things at home, or get along with other people?: Extremely difficult  PHQ9 TOTAL SCORE: 15  CHLOE 7 TOTAL SCORE: 6

## 2023-02-27 NOTE — TELEPHONE ENCOUNTER
Prior Authorization Retail Medication Request    Medication/Dose:   guanFACINE (INTUNIV) 1 MG TB24 24 hr tablet  Take 1 tablet (1 mg) by mouth At Bedtime Start on night of 3/3/23  ICD code (if different than what is on RX):  NA  Previously Tried and Failed:  NA  Rationale:  NA    Insurance Name:    COMMERCIAL GENERIC COMMERCIAL  [id number]       Insurance ID:  [id number]      Pharmacy Information (if different than what is on RX)  Name:  [facility]  Phone:  [facility]

## 2023-02-27 NOTE — NURSING NOTE
Is the patient currently in the state of [place]? YES    Visit mode:VIDEO    If the visit is dropped, the patient can be reconnected by: VIDEO VISIT: Text to cell phone: [phone]    Will anyone else be joining the visit? NO      How would you like to obtain your AVS? MyChart    Are changes needed to the allergy or medication list? NO    Reason for visit: New patient video visit.      JAYCEE Chavez February 27, 2023 8:40 AM

## 2023-03-01 NOTE — TELEPHONE ENCOUNTER
PA Initiation    Medication: Guanfacine 1mg PA INITIATED  Insurance Company: [employer] - Phone [phone] Fax [phone]  Pharmacy Filling the Rx: [facility] [id number] [place] [facility] [place] - [address]  Filling Pharmacy Phone: [phone]  Filling Pharmacy Fax:    Start Date: 3/1/2023    Central Prior Authorization Team   Phone: [phone]

## 2023-03-02 NOTE — TELEPHONE ENCOUNTER
Prior Authorization Approval    Authorization Effective Date: 3/1/2023  Authorization Expiration Date: 2/29/2024  Medication: Guanfacine 1mg PA APPROVED  Approved Dose/Quantity: 30  Reference #:     Insurance Company: [employer] - Phone [phone] Fax [phone]  Expected CoPay:       CoPay Card Available:      Foundation Assistance Needed:    Which Pharmacy is filling the prescription (Not needed for infusion/clinic administered): [facility] [id number] [place] [facility] [place] [facility] [address]  Pharmacy Notified: Yes  Patient Notified: Comment:  Pharmacy will notify patient.

## 2023-03-15 ASSESSMENT — PATIENT HEALTH QUESTIONNAIRE - PHQ9
SUM OF ALL RESPONSES TO PHQ QUESTIONS 1-9: 7
SUM OF ALL RESPONSES TO PHQ QUESTIONS 1-9: 7
10. IF YOU CHECKED OFF ANY PROBLEMS, HOW DIFFICULT HAVE THESE PROBLEMS MADE IT FOR YOU TO DO YOUR WORK, TAKE CARE OF THINGS AT HOME, OR GET ALONG WITH OTHER PEOPLE: VERY DIFFICULT

## 2023-03-16 ENCOUNTER — VIRTUAL VISIT (OUTPATIENT)
Dept: PSYCHIATRY | Facility: CLINIC | Age: 58
End: 2023-03-16
Payer: COMMERCIAL

## 2023-03-16 DIAGNOSIS — Z53.9 ERRONEOUS ENCOUNTER--DISREGARD: Primary | ICD-10-CM

## 2023-03-16 NOTE — PROGRESS NOTES
Pt unable to get video visit working on amwell or doximity; at last visit used an iPad. Prefers reschedule over phone audio only visit and possible charge. No evidence of acute psychiatric issues. Rescheduled to VV 3/24/23.

## 2023-03-16 NOTE — NURSING NOTE
Is the patient currently in the state of [place]? YES    Visit mode:VIDEO    If the visit is dropped, the patient can be reconnected by: VIDEO VISIT: Text to cell phone: [phone]    Will anyone else be joining the visit? NO      How would you like to obtain your AVS? MyChart    Are changes needed to the allergy or medication list? NO    Reason for visit: Video visit return - follow up from initial appt two weeks ago.    Pt stated she has a [employer] phone. Currently, [employer] does not support this phone. Pt unable to connect via [employer]. Reached out to provider and he stated he will send pt a [employer] link to join visit. Notified pt and pt will wait for link to join.      Rosalina Reagan,  TONIF

## 2023-03-24 ENCOUNTER — VIRTUAL VISIT (OUTPATIENT)
Dept: PSYCHIATRY | Facility: CLINIC | Age: 58
End: 2023-03-24
Payer: COMMERCIAL

## 2023-03-24 DIAGNOSIS — F33.0 MAJOR DEPRESSIVE DISORDER, RECURRENT EPISODE, MILD (H): ICD-10-CM

## 2023-03-24 DIAGNOSIS — F90.9 ATTENTION DEFICIT HYPERACTIVITY DISORDER (ADHD), UNSPECIFIED ADHD TYPE: ICD-10-CM

## 2023-03-24 PROCEDURE — 99214 OFFICE O/P EST MOD 30 MIN: CPT | Mod: VID | Performed by: PSYCHIATRY & NEUROLOGY

## 2023-03-24 RX ORDER — VENLAFAXINE 75 MG/1
TABLET ORAL
COMMUNITY
Start: 2023-03-24 | End: 2023-04-17 | Stop reason: DRUGHIGH

## 2023-03-24 RX ORDER — GUANFACINE 2 MG/1
2 TABLET, EXTENDED RELEASE ORAL AT BEDTIME
Qty: 30 TABLET | Refills: 1 | Status: SHIPPED | OUTPATIENT
Start: 2023-03-24 | End: 2023-05-16

## 2023-03-24 NOTE — NURSING NOTE
Is the patient currently in the state of [place]? YES    Visit mode:VIDEO    If the visit is dropped, the patient can be reconnected by: VIDEO VISIT: Text to cell phone:   Telephone Information:   Mobile [phone]       Will anyone else be joining the visit? No  (If patient encounters technical issues they should call [phone])    How would you like to obtain your AVS? MyChart    Are changes needed to the allergy or medication list? NO    Rooming Documentation: Patient will complete qnrs in mychart    Reason for visit:  Follow Up    JAYCEE Pittman

## 2023-03-24 NOTE — PROGRESS NOTES
Virtual Visit Details    Type of service:  Video Visit   Video Start: 9:38AM  Video end: 9:56 AM  Originating Location (pt. Location): Home  Distant Location (provider location):  Off-site  Platform used for Video Visit: [facility]      Collaborative Care Psychiatry Consult Note    IDENTIFICATION   Name: Kalani Sherwood   : 1965/57 year old      Sex:    @ female          Telemedicine Visit: The patient's condition can be safely assessed and treated via synchronous audio and visual telemedicine encounter.        Face to Face/patient Contact total time: 18 minutes  Pre Charting time: 1 minutes; Post charting time, communication and other activities: 0 minutes; Total time 19 minutes  9:37 AM          SUBJECTIVE   Reports doing good. Noticing benefits for focus, and sleeping much better. Anxiety decreased. Able to take on more such as organizing house and other tasks. Able to write down to dos and able to complete tasks better. Better at planning. Still feeling overwhelmed with organization at home.     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 Feels focus could be better still. Still feels like a foot is nailed to the floor and hard to figure out executively where to go next.     No side effects.         PHQ-9 scores:  PHQ-9 SCORE 2023 2023 3/15/2023   PHQ-9 Total Score - - -   PHQ-9 Total Score MyChart 14 (Moderate depression) 15 (Moderately severe depression) 7 (Mild depression)   PHQ-9 Total Score 14 15 7       CHLOE-7 scores:  CHLOE-7 SCORE 2012 10/21/2016 2023   Total Score 2 - -   Total Score - - 6 (mild anxiety)   Total Score - 2 6       OBJECTIVE     Vital Signs:   [facility] 2016     Labs:  n/a    Current Medications:  Current Outpatient Medications   Medication     amLODIPine (NORVASC) 10 MG tablet     carvedilol (COREG) 3.125 MG tablet     guanFACINE (INTUNIV) 1 MG TB24 24 hr tablet     hydrochlorothiazide  (HYDRODIURIL) 25 MG tablet     oxybutynin ER (DITROPAN XL) 15 MG 24 hr tablet     venlafaxine (EFFEXOR) 75 MG tablet     No current facility-administered medications for this visit.        The [place] Prescription Monitoring Program has been reviewed and there are no concerns about diversionary activity for controlled substances at this time.        ADDED HISTORY   BPS in 190s/120s in December and now down to 185/95, and last week 160/90.     MENTAL STATUS EXAMINATION:   Appearance: Intact attention to grooming and hygiene, casual garb; clutter with extra items scattered around different areas  Attitude: Cooperative  Eye Contact: Fair  Gait and Station: Sitting  Psychomotor Behavior: Within normal limits  Oriented to: Grossly person place and time  Attention Span and Concentration: Grossly intact  Speech: Slightly sad tone  Language: English  Mood:  sad   Affect: Constricted  Associations:  no loose associations  Thought Process:  logical, linear and goal oriented  Thought Content: No evidence of delusions or suicidal or homicidal ideation plan or intent  Memory: Grossly fair  Fund of Knowledge: Intact  Insight:  good  Judgment:  intact, adequate for safety  Impulse Control:  intact        DIAGNOSES:   ADHD, unspecified type  Major depressive disorder, moderate, recurrent  Unspecified anxiety disorder  Rule out alcohol use disorder, moderate, in partial remission or sustained remission      ASSESSMENT:   Patient with clear history and early childhood onset of ADHD.  Does have symptomatology of major depression as well as anxiety difficulty, both of which are associated with ADHD and impacted by it.  Pursue treatment.  Given hypertension issues, utilize Intuniv.  Venlafaxine may be a contributing factor, we will plan to cross taper to SSRI.    Today Kalani CRISTELA Sherwood reports no suicidal ideations. In addition, she has notable risk factors for self-harm, including anxiety. However, risk is mitigated by commitment to  family, Rastafari beliefs and absence of past attempts. Therefore, based on all available evidence including the factors cited above, she does not appear to be at imminent risk for self-harm, does not meet criteria for a 72-hr hold, and therefore remains appropriate for ongoing outpatient level of care.       PLAN:       Patient advised of consultative model. Patient will continue to be seen for ongoing consultation and stabilization.    Does not meet criteria for involuntary treatment or hospitalization    Trial Intuniv 3/3/2023 1 mg nightly => 3/24/23 2 mg po at bedtime qday if no drowsiness-Risks, benefits and alternatives discussed.  Patient provides verbal consent to treatment.    Continue venlafaxine 150 mg p.o. twice daily => 3/24/23 150 mg po qam/112.5 mg po qpm-Risks, benefits and alternatives discussed.  Patient provides verbal consent to treatment.  Cross-taper to SSRI given hypertension concerns    Labs -reviewed, no further labs indicated at this time    Return in 2 to 3 weeks    Administrative Billing:   Time spent with patient was 30 minutes and greater than 50% of time or 20 minutes was spent in counseling and coordination of care regarding above diagnoses and treatment plan.       Signed:   Home Coughlin M.D.  [facility] Psychiatry Service    Disclaimer: This note consists of symbols derived from keyboarding, dictation and/or voice recognition software. As a result, there may be errors in the script that have gone undetected. Please consider this when interpreting information found in this chart.

## 2023-04-17 ENCOUNTER — VIRTUAL VISIT (OUTPATIENT)
Dept: PSYCHIATRY | Facility: CLINIC | Age: 58
End: 2023-04-17
Payer: COMMERCIAL

## 2023-04-17 DIAGNOSIS — F33.0 MAJOR DEPRESSIVE DISORDER, RECURRENT EPISODE, MILD (H): Primary | ICD-10-CM

## 2023-04-17 PROCEDURE — 99214 OFFICE O/P EST MOD 30 MIN: CPT | Mod: VID | Performed by: PSYCHIATRY & NEUROLOGY

## 2023-04-17 RX ORDER — ESCITALOPRAM OXALATE 5 MG/1
5 TABLET ORAL DAILY
Qty: 30 TABLET | Refills: 1 | Status: SHIPPED | OUTPATIENT
Start: 2023-04-17 | End: 2023-05-22

## 2023-04-17 RX ORDER — VENLAFAXINE 75 MG/1
TABLET ORAL
Qty: 56 TABLET | Refills: 0 | Status: SHIPPED | OUTPATIENT
Start: 2023-04-17 | End: 2023-05-22 | Stop reason: SINTOL

## 2023-04-17 NOTE — PROGRESS NOTES
Virtual Visit Details    Type of service:  Video Visit     Originating Location (pt. Location): Home  Distant Location (provider location):  On-site  Platform used for Video Visit: [facility] Psychiatry Consult Note    IDENTIFICATION   Name: Kalani Sherwood   : 1965/57 year old      Sex:    @ female          Telemedicine Visit: The patient's condition can be safely assessed and treated via synchronous audio and visual telemedicine encounter.        Face to Face/patient Contact total time: 12 minutes  Pre Charting time: 1 minutes; Post charting time, communication and other activities: 2minutes; Total time 15minutes  1:31 PM      1:32PM - 1:44PM-video face-to-face time    SUBJECTIVE   Things are going well. Very little issues with anxiety. Anxiety previously was making decision making difficult. Sleeping well. Thoughts are organized. No issues with reducing venlafaxine.         PHQ-9 scores:      2023    12:02 PM 2023     2:25 PM 3/15/2023     5:21 PM   PHQ-9 SCORE   PHQ-9 Total Score MyChart 14 (Moderate depression) 15 (Moderately severe depression) 7 (Mild depression)   PHQ-9 Total Score 14 15 7       CHLOE-7 scores:      2012    10:03 AM 10/21/2016     9:24 AM 2023     2:28 PM   CHLOE-7 SCORE   Total Score 2     Total Score   6 (mild anxiety)   Total Score  2 6       OBJECTIVE     Vital Signs:   LMP 2016       Current Medications:  Current Outpatient Medications   Medication     amLODIPine (NORVASC) 10 MG tablet     carvedilol (COREG) 3.125 MG tablet     guanFACINE (INTUNIV) 2 MG TB24 24 hr tablet     hydrochlorothiazide (HYDRODIURIL) 25 MG tablet     oxybutynin ER (DITROPAN XL) 15 MG 24 hr tablet     venlafaxine (EFFEXOR) 75 MG tablet     No current facility-administered medications for this visit.        The [place] Prescription Monitoring Program has been reviewed and there are no concerns about diversionary activity for controlled substances at this time.         ADDED HISTORY   Reports blood pressure today was 153/90. Took lexapro many years ago - worked and then stopped working.     MENTAL STATUS EXAMINATION:   Appearance: Intact attention to grooming and hygiene, casual garb; clutter with extra items scattered around different areas  Attitude: Cooperative  Eye Contact: Fair  Gait and Station: Sitting  Psychomotor Behavior: Within normal limits  Oriented to: Grossly person place and time  Attention Span and Concentration: Grossly intact  Speech: Slightly sad tone  Language: English  Mood:  good  Affect: euthymic  Associations:  no loose associations  Thought Process:  logical, linear and goal oriented  Thought Content: No evidence of delusions or suicidal or homicidal ideation plan or intent  Memory: Grossly fair  Fund of Knowledge: Intact  Insight:  good  Judgment:  intact, adequate for safety  Impulse Control:  intact        DIAGNOSES:   ADHD, unspecified type  Major depressive disorder, moderate, recurrent  Unspecified anxiety disorder  Rule out alcohol use disorder, moderate, in partial remission or sustained remission      ASSESSMENT:   Patient with clear history and early childhood onset of ADHD.  Does have symptomatology of major depression as well as anxiety difficulty, both of which are associated with ADHD and impacted by it.  Pursue treatment.  Given hypertension issues, utilize Intuniv.  Venlafaxine may be a contributing factor, we will plan to cross taper to SSRI.    Today Kalani ARCHIBALD Presley reports no suicidal ideations. In addition, she has notable risk factors for self-harm, including anxiety. However, risk is mitigated by commitment to family, Scientologist beliefs and absence of past attempts. Therefore, based on all available evidence including the factors cited above, she does not appear to be at imminent risk for self-harm, does not meet criteria for a 72-hr hold, and therefore remains appropriate for ongoing outpatient level of care.       PLAN:        Patient advised of consultative model. Patient will continue to be seen for ongoing consultation and stabilization.    Does not meet criteria for involuntary treatment or hospitalization    Trial Intuniv 3/3/2023 1 mg nightly => 3/24/23 2 mg po at bedtime efficacy without side effects-Risks, benefits and alternatives discussed.  Patient provides verbal consent to treatment.    Continue venlafaxine 150 mg p.o. twice daily => 3/24/23 150 mg po qam/112.5 mg po qpm =>=> 4/17/23 75 mg po bid x 2 weeks then 37.5 mg po bid x 4 weeks then DC-Risks, benefits and alternatives discussed.  Patient provides verbal consent to treatment.  Cross-taper to SSRI given hypertension concerns    Restart escitalopram 4/17/23 5 mg po qday -Risks, benefits and alternatives discussed.  Patient provides verbal consent to treatment.    DC'd venlafaxine (possible contribution to HTN)    Labs -reviewed, no further labs indicated at this time    Return in 2 to 3 weeks    Administrative Billing:   Time spent with patient was greater than 50% of time and/or significant time was spent in counseling and coordination of care regarding above diagnoses and treatment plan. Pre charting time and post charting time/documentation/coordination are done on date of service.      Signed:   Home Coughlin M.D.  [facility] Psychiatry Service    Disclaimer: This note consists of symbols derived from keyboarding, dictation and/or voice recognition software. As a result, there may be errors in the script that have gone undetected. Please consider this when interpreting information found in this chart.

## 2023-04-17 NOTE — PATIENT INSTRUCTIONS
"Patient Education   Collaborative Care Psychiatry Service  What to Expect  Here's what to expect from your Collaborative Care Psychiatry Service (CCPS).   About CCPS  CCPS means 2 people work together to help you get better. You'll meet with a behavioral health clinician and a psychiatric doctor. A behavioral health clinician helps people with mental health problems by talking with them. A psychiatric doctor helps people by giving them medicine.  How it works  At every visit, you'll see the behavioral health clinician (BHC) first. They'll talk with you about how you're doing and teach you how to feel better.   Then you'll see the psychiatric doctor. This doctor can help you deal with troubling thoughts and feelings by giving you medicine. They'll make sure you know the plan for your care.   CCPS usually takes 3 to 6 visits. If you need more visits, we may have you start seeing a different psychiatric doctor for ongoing care.  If you have any questions or concerns, we'll be glad to talk with you.  About visits  Be open  At your visits, please talk openly about your problems. It may feel hard, but it's the best way for us to help you.  Cancelling visits  If you can't come to your visit, please call us right away at [phone]. If you don't cancel at least 24 hours (1 full day) before your visit, that's \"late cancellation.\"  Being late to visits  Being very late is the same as not showing up. You will be a \"no show\" if:  Your appointment starts with a BHC, and you're more than 15 minutes late for a 30-minute (half hour) visit. This will also cancel your appointment with the psychiatric doctor.  Your appointment is with a psychiatric doctor only, and you're more than 15 minutes late for a 30-minute (half hour) visit.  Your appointment is with a psychiatric doctor only, and you're more than 30 minutes late for a 60-minute (full hour) visit.  If you cancel late or don't show up 2 times within 6 months, we may end your " care.   Getting help between visits  If you need help between visits, you can call us Monday to Friday from 8 a.m. to 4:30 p.m. at [phone].  Emergency care  Call 911 or go to the nearest emergency department if your life or someone else's life is in danger.  Call 988 anytime to reach the national Suicide and Crisis hotline.  Medicine refills  To refill your medicine, call your pharmacy. You can also call [facility]'s Behavioral Access at [phone], Monday to Friday, 8 a.m. to 4:30 p.m. It can take 1 to 3 business days to get a refill.   Forms, letters, and tests  You may have papers to fill out, like FMLA, short-term disability, and workability. We can help you with these forms at your visits, but you must have an appointment. You may need more than 1 visit for this, to be in an intensive therapy program, or both.  Before we can give you medicine for ADHD, we may refer you to get tested for it or confirm it another way.  We may not be able to give you an emotional support animal letter.  We don't do mental health checks ordered by the court.   We don't do mental health testing, but we can refer you to get tested.   Thank you for choosing us for your care.  For informational purposes only. Not to replace the advice of your health care provider. Copyright   2022 [facility]. All rights reserved. [employer] 889222 - 12/22.

## 2023-04-17 NOTE — NURSING NOTE
Is the patient currently in the state of [place]? YES    Visit mode:VIDEO    If the visit is dropped, the patient can be reconnected by: VIDEO VISIT: Send to e-mail at: [email or web address]    Will anyone else be joining the visit? NO      How would you like to obtain your AVS? MyChart    Are changes needed to the allergy or medication list? NO    Reason for visit: Video Visit    TANVI Duff/CRISTHIAN

## 2023-04-18 ENCOUNTER — TELEPHONE (OUTPATIENT)
Dept: PSYCHIATRY | Facility: CLINIC | Age: 58
End: 2023-04-18
Payer: COMMERCIAL

## 2023-04-19 NOTE — TELEPHONE ENCOUNTER
Refill request received for guanFACINE (INTUNIV) 2 MG TB24 24 hr tablet, last script wrote on 3/24/23 with one refill. Patient has a follow up visit on 5/22/23.

## 2023-04-21 DIAGNOSIS — R32 URINARY INCONTINENCE, UNSPECIFIED TYPE: ICD-10-CM

## 2023-04-21 DIAGNOSIS — I10 ESSENTIAL HYPERTENSION WITH GOAL BLOOD PRESSURE LESS THAN 130/80: ICD-10-CM

## 2023-04-21 RX ORDER — HYDROCHLOROTHIAZIDE 25 MG/1
TABLET ORAL
Qty: 90 TABLET | Refills: 0 | Status: SHIPPED | OUTPATIENT
Start: 2023-04-21 | End: 2023-07-20

## 2023-04-21 RX ORDER — CARVEDILOL 3.12 MG/1
TABLET ORAL
Qty: 180 TABLET | Refills: 0 | Status: SHIPPED | OUTPATIENT
Start: 2023-04-21 | End: 2023-07-20

## 2023-04-21 RX ORDER — TOLTERODINE 4 MG/1
CAPSULE, EXTENDED RELEASE ORAL
Qty: 90 CAPSULE | Refills: 0 | OUTPATIENT
Start: 2023-04-21

## 2023-04-21 RX ORDER — AMLODIPINE BESYLATE 10 MG/1
10 TABLET ORAL DAILY
Qty: 90 TABLET | Refills: 0 | Status: SHIPPED | OUTPATIENT
Start: 2023-04-21 | End: 2023-07-20

## 2023-05-16 ENCOUNTER — MYC REFILL (OUTPATIENT)
Dept: PSYCHIATRY | Facility: CLINIC | Age: 58
End: 2023-05-16
Payer: COMMERCIAL

## 2023-05-16 DIAGNOSIS — F90.9 ATTENTION DEFICIT HYPERACTIVITY DISORDER (ADHD), UNSPECIFIED ADHD TYPE: ICD-10-CM

## 2023-05-16 RX ORDER — GUANFACINE 2 MG/1
2 TABLET, EXTENDED RELEASE ORAL AT BEDTIME
Qty: 30 TABLET | Refills: 1 | Status: SHIPPED | OUTPATIENT
Start: 2023-05-16 | End: 2023-05-22

## 2023-05-16 NOTE — TELEPHONE ENCOUNTER
Date of Last Office Visit: 4/17/23  Date of Next Office Visit: 5/22/23  No shows since last visit: 0  Cancellations since last visit: 0    Medication requested: guanFACINE (INTUNIV) 2 MG TB24 24 hr tablet Date last ordered: 3/24/23 Qty: 30 Refills: 1     Review of [place] ?: NA    Lapse in medication adherence greater than 5 days?: No  If yes, call patient and gather details: na  Medication refill request verified as identical to current order?: yes  Result of Last DAM, VPA, Li+ Level, CBC, or Carbamazepine Level (at or since last visit): N/A      Last visit treatment plan: Trial Intuniv 3/3/2023 1 mg nightly => 3/24/23 2 mg po at bedtime efficacy without side effects-Risks, benefits and alternatives discussed.  Patient provides verbal consent to treatment.    Continue venlafaxine 150 mg p.o. twice daily => 3/24/23 150 mg po qam/112.5 mg po qpm =>=> 4/17/23 75 mg po bid x 2 weeks then 37.5 mg po bid x 4 weeks then DC-Risks, benefits and alternatives discussed.  Patient provides verbal consent to treatment.  Cross-taper to SSRI given hypertension concerns    Restart escitalopram 4/17/23 5 mg po qday -Risks, benefits and alternatives discussed.  Patient provides verbal consent to treatment.    DC'd venlafaxine (possible contribution to HTN)    Labs -reviewed, no further labs indicated at this time  Return in 2 to 3 weeks    [x]Medication refilled per  Medication Refill in Ambulatory Care  policy.  []Medication unable to be refilled by RN due to criteria not met as indicated below:    []Eligibility - not seen in the last year   []Supervision - no future appointment   []Compliance - no shows, cancellations or lapse in therapy   []Verification - order discrepancy   []Controlled medication   []Medication not included in policy   []90-day supply request   []Other

## 2023-05-22 ENCOUNTER — VIRTUAL VISIT (OUTPATIENT)
Dept: PSYCHIATRY | Facility: CLINIC | Age: 58
End: 2023-05-22
Payer: COMMERCIAL

## 2023-05-22 DIAGNOSIS — F33.0 MAJOR DEPRESSIVE DISORDER, RECURRENT EPISODE, MILD (H): ICD-10-CM

## 2023-05-22 DIAGNOSIS — F90.9 ATTENTION DEFICIT HYPERACTIVITY DISORDER (ADHD), UNSPECIFIED ADHD TYPE: ICD-10-CM

## 2023-05-22 PROCEDURE — 99214 OFFICE O/P EST MOD 30 MIN: CPT | Mod: VID | Performed by: PSYCHIATRY & NEUROLOGY

## 2023-05-22 RX ORDER — ESCITALOPRAM OXALATE 10 MG/1
10 TABLET ORAL DAILY
Qty: 30 TABLET | Refills: 1 | Status: SHIPPED | OUTPATIENT
Start: 2023-05-22 | End: 2023-07-24

## 2023-05-22 RX ORDER — GUANFACINE 2 MG/1
2 TABLET, EXTENDED RELEASE ORAL AT BEDTIME
Qty: 30 TABLET | Refills: 1 | Status: SHIPPED | OUTPATIENT
Start: 2023-05-22 | End: 2023-06-19

## 2023-05-22 ASSESSMENT — ANXIETY QUESTIONNAIRES
2. NOT BEING ABLE TO STOP OR CONTROL WORRYING: MORE THAN HALF THE DAYS
4. TROUBLE RELAXING: MORE THAN HALF THE DAYS
1. FEELING NERVOUS, ANXIOUS, OR ON EDGE: NEARLY EVERY DAY
6. BECOMING EASILY ANNOYED OR IRRITABLE: MORE THAN HALF THE DAYS
5. BEING SO RESTLESS THAT IT IS HARD TO SIT STILL: MORE THAN HALF THE DAYS
7. FEELING AFRAID AS IF SOMETHING AWFUL MIGHT HAPPEN: MORE THAN HALF THE DAYS
3. WORRYING TOO MUCH ABOUT DIFFERENT THINGS: NEARLY EVERY DAY
GAD7 TOTAL SCORE: 16
7. FEELING AFRAID AS IF SOMETHING AWFUL MIGHT HAPPEN: MORE THAN HALF THE DAYS
IF YOU CHECKED OFF ANY PROBLEMS ON THIS QUESTIONNAIRE, HOW DIFFICULT HAVE THESE PROBLEMS MADE IT FOR YOU TO DO YOUR WORK, TAKE CARE OF THINGS AT HOME, OR GET ALONG WITH OTHER PEOPLE: SOMEWHAT DIFFICULT
GAD7 TOTAL SCORE: 16
GAD7 TOTAL SCORE: 16
8. IF YOU CHECKED OFF ANY PROBLEMS, HOW DIFFICULT HAVE THESE MADE IT FOR YOU TO DO YOUR WORK, TAKE CARE OF THINGS AT HOME, OR GET ALONG WITH OTHER PEOPLE?: SOMEWHAT DIFFICULT

## 2023-05-22 NOTE — PROGRESS NOTES
Virtual Visit Details    Type of service:  Video Visit     Originating Location (pt. Location): Home  Distant Location (provider location):  Off-site  Platform used for Video Visit: [facility] Psychiatry Consult Note    IDENTIFICATION   Name: Kalani Sherwood   : 1965/57 year old      Sex:    @ female          Telemedicine Visit: The patient's condition can be safely assessed and treated via synchronous audio and visual telemedicine encounter.        Face to Face/patient Contact total time: 14 minutes  Pre Charting time: 1 minutes; Post charting time, communication and other activities: 2 minutes; Total time 17 minutes            SUBJECTIVE   Anxiety and attention issues have been worse the last two weeks. She feels like a hangover. No flu like symptoms.         PHQ-9 scores:      2023    12:02 PM 2023     2:25 PM 3/15/2023     5:21 PM   PHQ-9 SCORE   PHQ-9 Total Score MyChart 14 (Moderate depression) 15 (Moderately severe depression) 7 (Mild depression)   PHQ-9 Total Score 14 15 7       CHLOE-7 scores:      10/21/2016     9:24 AM 2023     2:28 PM 2023     1:25 AM   CHLOE-7 SCORE   Total Score  6 (mild anxiety) 16 (severe anxiety)   Total Score 2 6 16       OBJECTIVE     Vital Signs:   [facility] 2016     Labs:  n/a    Current Medications:  Current Outpatient Medications   Medication     amLODIPine (NORVASC) 10 MG tablet     carvedilol (COREG) 3.125 MG tablet     escitalopram (LEXAPRO) 5 MG tablet     guanFACINE (INTUNIV) 2 MG TB24 24 hr tablet     hydrochlorothiazide (HYDRODIURIL) 25 MG tablet     oxybutynin ER (DITROPAN XL) 15 MG 24 hr tablet     venlafaxine (EFFEXOR) 75 MG tablet     No current facility-administered medications for this visit.        The [place] Prescription Monitoring Program has been reviewed and there are no concerns about diversionary activity for controlled substances at this time.        ADDED HISTORY   n/a    MENTAL STATUS EXAMINATION:    Appearance: Intact attention to grooming and hygiene, casual garb; clutter with extra items scattered around different areas  Attitude: Cooperative  Eye Contact: Fair  Gait and Station: Sitting  Psychomotor Behavior: Within normal limits  Oriented to: Grossly person place and time  Attention Span and Concentration: Grossly intact  Speech: Slightly sad tone  Language: English  Mood:  good  Affect: euthymic  Associations:  no loose associations  Thought Process:  logical, linear and goal oriented  Thought Content: No evidence of delusions or suicidal or homicidal ideation plan or intent  Memory: Grossly fair  Fund of Knowledge: Intact  Insight:  good  Judgment:  intact, adequate for safety  Impulse Control:  intact        DIAGNOSES:   ADHD, unspecified type  Major depressive disorder, moderate, recurrent  Unspecified anxiety disorder  Rule out alcohol use disorder, moderate, in partial remission or sustained remission      ASSESSMENT:   Patient with clear history and early childhood onset of ADHD.  Does have symptomatology of major depression as well as anxiety difficulty, both of which are associated with ADHD and impacted by it.  Pursue treatment.  Given hypertension issues, utilize Intuniv.  Venlafaxine may be a contributing factor, we will plan to cross taper to SSRI.    Today Kalani ARCHIBALD Presley reports no suicidal ideations. In addition, she has notable risk factors for self-harm, including anxiety. However, risk is mitigated by commitment to family, Protestant beliefs and absence of past attempts. Therefore, based on all available evidence including the factors cited above, she does not appear to be at imminent risk for self-harm, does not meet criteria for a 72-hr hold, and therefore remains appropriate for ongoing outpatient level of care.       PLAN:       Patient advised of consultative model. Patient will continue to be seen for ongoing consultation and stabilization.    Does not meet criteria for  involuntary treatment or hospitalization    Trial Intuniv 3/3/2023 1 mg nightly => 3/24/23 2 mg po at bedtime efficacy without side effects-Risks, benefits and alternatives discussed.  Patient provides verbal consent to treatment.    Continue venlafaxine 150 mg p.o. twice daily => 3/24/23 150 mg po qam/112.5 mg po qpm =>=> 4/17/23 75 mg po bid x 2 weeks then 37.5 mg po bid x 4 weeks then DC-Risks, benefits and alternatives discussed.  Patient provides verbal consent to treatment.  Cross-taper to SSRI given hypertension concerns; f/u repeat BP measures and compare to BP's in [facility] when on effexor    Restart escitalopram 4/17/23 5 mg po qday => 5/22/23 10 mg po qday -Risks, benefits and alternatives discussed.  Patient provides verbal consent to treatment.    DC'd venlafaxine (possible contribution to HTN)    Labs -reviewed, no further labs indicated at this time    Return in 2 to 3 weeks    Administrative Billing:   Time spent with patient was greater than 50% of time and/or significant time was spent in counseling and coordination of care regarding above diagnoses and treatment plan. Pre charting time and post charting time/documentation/coordination are done on date of service.      Signed:   Home Coughlin M.D.  [facility] Psychiatry Service    Disclaimer: This note consists of symbols derived from keyboarding, dictation and/or voice recognition software. As a result, there may be errors in the script that have gone undetected. Please consider this when interpreting information found in this chart.

## 2023-05-22 NOTE — NURSING NOTE
Is the patient currently in the state of [place]? YES    Visit mode:VIDEO    If the visit is dropped, the patient can be reconnected by: VIDEO VISIT: Text to cell phone:   Telephone Information:   Mobile [phone]       Will anyone else be joining the visit? No  (If patient encounters technical issues they should call [phone])    How would you like to obtain your AVS? MyChart    Are changes needed to the allergy or medication list? NO    Rooming Documentation: Attendance Guidelines - Care team has reviewed attendance agreement with patient. Patient advised that two failed appointments within 6 months may lead to termination of current episode of care.      Reason for visit: Video Visit     JAYCEE Pickett

## 2023-06-12 ENCOUNTER — MYC REFILL (OUTPATIENT)
Dept: PSYCHIATRY | Facility: CLINIC | Age: 58
End: 2023-06-12
Payer: COMMERCIAL

## 2023-06-12 DIAGNOSIS — F90.9 ATTENTION DEFICIT HYPERACTIVITY DISORDER (ADHD), UNSPECIFIED ADHD TYPE: ICD-10-CM

## 2023-06-12 NOTE — CONFIDENTIAL NOTE
Incoming refill request for guanFACINE (INTUNIV) 2 MG TB24 24 hr tablet, last wrote on 5/22/23 #30 with one refill. Patient should still have refill at pharmacy. Patient also has an appointment with provider on 6/19/23

## 2023-06-13 RX ORDER — GUANFACINE 2 MG/1
2 TABLET, EXTENDED RELEASE ORAL AT BEDTIME
OUTPATIENT
Start: 2023-06-13

## 2023-06-15 ENCOUNTER — MYC REFILL (OUTPATIENT)
Dept: PSYCHIATRY | Facility: CLINIC | Age: 58
End: 2023-06-15
Payer: COMMERCIAL

## 2023-06-15 DIAGNOSIS — F33.0 MAJOR DEPRESSIVE DISORDER, RECURRENT EPISODE, MILD (H): ICD-10-CM

## 2023-06-15 RX ORDER — ESCITALOPRAM OXALATE 10 MG/1
10 TABLET ORAL DAILY
Qty: 30 TABLET | Refills: 1 | OUTPATIENT
Start: 2023-06-15

## 2023-06-15 NOTE — TELEPHONE ENCOUNTER
CRISTHIAN received faxed refill request for escitalopram (LEXAPRO) 10 MG tablet Per chart review pt was last seen 5/22/2023 this medication was last filled 5/22/2023    Patient does not have a follow-up appointment scheduled on 6/19/2023.     Routing to RN to deny refill to soon to fill    Rosio Jacobson CMA Nia 15, 2023

## 2023-06-19 ENCOUNTER — VIRTUAL VISIT (OUTPATIENT)
Dept: PSYCHIATRY | Facility: CLINIC | Age: 58
End: 2023-06-19
Payer: COMMERCIAL

## 2023-06-19 DIAGNOSIS — F90.9 ATTENTION DEFICIT HYPERACTIVITY DISORDER (ADHD), UNSPECIFIED ADHD TYPE: ICD-10-CM

## 2023-06-19 PROCEDURE — 99214 OFFICE O/P EST MOD 30 MIN: CPT | Mod: VID | Performed by: PSYCHIATRY & NEUROLOGY

## 2023-06-19 RX ORDER — GUANFACINE 3 MG/1
3 TABLET, EXTENDED RELEASE ORAL EVERY MORNING
Qty: 30 TABLET | Refills: 3 | Status: SHIPPED | OUTPATIENT
Start: 2023-06-19 | End: 2023-07-31

## 2023-06-19 NOTE — NURSING NOTE
Is the patient currently in the state of [place]? YES    Visit mode:VIDEO    If the visit is dropped, the patient can be reconnected by: VIDEO VISIT: Text to cell phone: [phone]    Will anyone else be joining the visit? NO      How would you like to obtain your AVS? MyChart    Are changes needed to the allergy or medication list? NO    Reason for visit: RECHECK

## 2023-06-19 NOTE — PROGRESS NOTES
Virtual Visit Details    Type of service:  Video Visit     Originating Location (pt. Location): Home  Distant Location (provider location):  On-site  Platform used for Video Visit: [facility] Psychiatry Consult Note    IDENTIFICATION   Name: Kalani Sherwood   : 1965/57 year old      Sex:    @ female          Telemedicine Visit: The patient's condition can be safely assessed and treated via synchronous audio and visual telemedicine encounter.        Face to Face/patient Contact total time: 20 minutes  Pre Charting time: 0 minutes; Post charting time, communication and other activities: 0 minutes; Total time 20 minutes  3:05 PM-3:25 PM          SUBJECTIVE   Doing well. Medication changes have gone well. Blood pressure is improved - now in low 140s/low80s. However dealing with fatigue. Hard to get through workday by 2-3pm- could easily nod off - two weeks ago after eliminating coffee and caffeine; she cut down out of HTN concerns. Not experiencing drowsiness as med side effect.     ADHD - much better organization, mental clarity and focus. However lacking excitement about social events; prefers to be at home rather than in dog training programs she feels she should attend; not looking forward to seeing friends for an expo.     A lot of anxiety when having to prep to going to events. No taxing chores and not particularly difficult. Has been fun and exciting. Not sure where to begin; home more cluttered. With intuniv it was an improvement. Last event was in April - once she gets there things are good; getting there is difficult. Gets heart racing, palpitations, chest pressure, flushing- mini anxiety attacks.         PHQ-9 scores:      2023    12:02 PM 2023     2:25 PM 3/15/2023     5:21 PM   PHQ-9 SCORE   PHQ-9 Total Score MyChart 14 (Moderate depression) 15 (Moderately severe depression) 7 (Mild depression)   PHQ-9 Total Score 14 15 7       CHLOE-7 scores:      10/21/2016     9:24 AM  2/26/2023     2:28 PM 5/22/2023     1:25 AM   CHLOE-7 SCORE   Total Score  6 (mild anxiety) 16 (severe anxiety)   Total Score 2 6 16       OBJECTIVE     Vital Signs:   LMP 09/12/2016     Labs:  na    Current Medications:  Current Outpatient Medications   Medication     amLODIPine (NORVASC) 10 MG tablet     carvedilol (COREG) 3.125 MG tablet     escitalopram (LEXAPRO) 10 MG tablet     guanFACINE (INTUNIV) 2 MG TB24 24 hr tablet     hydrochlorothiazide (HYDRODIURIL) 25 MG tablet     oxybutynin ER (DITROPAN XL) 15 MG 24 hr tablet     No current facility-administered medications for this visit.        The [place] Prescription Monitoring Program has been reviewed and there are no concerns about diversionary activity for controlled substances at this time.        ADDED HISTORY   Was drinking 24 oz of coffee. Caffeine reduction in the past did not lead to improvements. 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 Has litter of 5 6 week old puppies. Used to get excited about this but requires cleaning the house but that is still immensely overwhelming. No hx asthma.     Reports blood pressure would be low at [facility] but anywhere else would be high.     MENTAL STATUS EXAMINATION:   Appearance: Intact attention to grooming and hygiene, casual garb; clutter with extra items scattered around different areas  Attitude: Cooperative  Eye Contact: Fair  Gait and Station: Sitting  Psychomotor Behavior: Within normal limits  Oriented to: Grossly person place and time  Attention Span and Concentration: Grossly intact  Speech: Slightly sad tone  Language: English  Mood:  good  Affect: euthymic  Associations:  no loose associations  Thought Process:  logical, linear and goal oriented  Thought Content: No evidence of delusions or suicidal or homicidal ideation plan or intent  Memory: Grossly fair  Fund of Knowledge: Intact  Insight:  good  Judgment:  intact, adequate for safety  Impulse Control:   intact        DIAGNOSES:   ADHD, unspecified type  Major depressive disorder, moderate, recurrent  Unspecified anxiety disorder  Rule out alcohol use disorder, moderate, in partial remission or sustained remission      ASSESSMENT:   Patient with clear history and early childhood onset of ADHD.  Does have symptomatology of major depression as well as anxiety difficulty, both of which are associated with ADHD and impacted by it.  Pursue treatment.  Given hypertension issues, utilize Intuniv.  Venlafaxine may be a contributing factor, we will plan to cross taper to SSRI.    Today Kalani Sherwood reports no suicidal ideations. In addition, she has notable risk factors for self-harm, including anxiety. However, risk is mitigated by commitment to family, Synagogue beliefs and absence of past attempts. Therefore, based on all available evidence including the factors cited above, she does not appear to be at imminent risk for self-harm, does not meet criteria for a 72-hr hold, and therefore remains appropriate for ongoing outpatient level of care.       PLAN:       Patient advised of consultative model. Patient will continue to be seen for ongoing consultation and stabilization.    Does not meet criteria for involuntary treatment or hospitalization    Trial Intuniv 3/3/2023 1 mg nightly => 3/24/23 2 mg po at bedtime efficacy without side effects, without full remission => 6/19/23 3 mg po qam -Risks, benefits and alternatives discussed.  Patient provides verbal consent to treatment.    Restart escitalopram 4/17/23 5 mg po qday => 5/22/23 10 mg po qday -Risks, benefits and alternatives discussed.  Patient provides verbal consent to treatment.    Consider escitalopram; propranolol     DC'd venlafaxine (possible contribution to HTN)    Labs -reviewed, no further labs indicated at this time    Return in 2 to 3 weeks    Administrative Billing:   Time spent with patient was greater than 50% of time and/or significant time was  spent in counseling and coordination of care regarding above diagnoses and treatment plan. Pre charting time and post charting time/documentation/coordination are done on date of service.      Signed:   Home Coughlin M.D.  [facility] Psychiatry Service    Disclaimer: This note consists of symbols derived from keyboarding, dictation and/or voice recognition software. As a result, there may be errors in the script that have gone undetected. Please consider this when interpreting information found in this chart.

## 2023-07-14 ENCOUNTER — MYC REFILL (OUTPATIENT)
Dept: PSYCHIATRY | Facility: CLINIC | Age: 58
End: 2023-07-14
Payer: COMMERCIAL

## 2023-07-14 DIAGNOSIS — F90.9 ATTENTION DEFICIT HYPERACTIVITY DISORDER (ADHD), UNSPECIFIED ADHD TYPE: ICD-10-CM

## 2023-07-14 NOTE — CONFIDENTIAL NOTE
Incoming refill request for guanFACINE (INTUNIV) 3 MG TB24 24 hr tablet, this was last wrote on 6/19/23 for #30 with three refills. Patient should still have refills on file at pharmacy.

## 2023-07-17 RX ORDER — GUANFACINE 3 MG/1
3 TABLET, EXTENDED RELEASE ORAL EVERY MORNING
OUTPATIENT
Start: 2023-07-17

## 2023-07-17 NOTE — TELEPHONE ENCOUNTER
Guanfacine was prescribed on 6/19/23 for 30 tablets and 3 refills. Patient should have refills on file. Will refuse medication.     CECILIA CORONADO RN on 7/17/2023 at 9:08 AM

## 2023-07-20 DIAGNOSIS — I10 ESSENTIAL HYPERTENSION WITH GOAL BLOOD PRESSURE LESS THAN 130/80: ICD-10-CM

## 2023-07-20 RX ORDER — AMLODIPINE BESYLATE 10 MG/1
10 TABLET ORAL DAILY
Qty: 90 TABLET | Refills: 0 | Status: SHIPPED | OUTPATIENT
Start: 2023-07-20 | End: 2023-10-19

## 2023-07-20 RX ORDER — CARVEDILOL 3.12 MG/1
TABLET ORAL
Qty: 180 TABLET | Refills: 0 | Status: SHIPPED | OUTPATIENT
Start: 2023-07-20 | End: 2023-10-19

## 2023-07-20 RX ORDER — HYDROCHLOROTHIAZIDE 25 MG/1
TABLET ORAL
Qty: 90 TABLET | Refills: 0 | Status: SHIPPED | OUTPATIENT
Start: 2023-07-20 | End: 2023-10-19

## 2023-07-24 ENCOUNTER — MYC REFILL (OUTPATIENT)
Dept: PSYCHIATRY | Facility: CLINIC | Age: 58
End: 2023-07-24
Payer: COMMERCIAL

## 2023-07-24 DIAGNOSIS — F33.0 MAJOR DEPRESSIVE DISORDER, RECURRENT EPISODE, MILD (H): ICD-10-CM

## 2023-07-24 NOTE — CONFIDENTIAL NOTE
Date of Last Office Visit: 6/1923  Date of Next Office Visit: 7/31/23  No shows since last visit: 0  Cancellations since last visit: 0    Medication requested: escitalopram (LEXAPRO) 10 MG tablet  Date last ordered: 5/22/23 Qty: 30 Refills: 1      Lapse in medication adherence greater than 5 days?: no  If yes, call patient and gather details: na  Medication refill request verified as identical to current order?: yes  Result of Last DAM, VPA, Li+ Level, CBC, or Carbamazepine Level (at or since last visit): N/A    Last visit treatment plan:   Patient advised of consultative model. Patient will continue to be seen for ongoing consultation and stabilization.  Does not meet criteria for involuntary treatment or hospitalization  Trial Intuniv 3/3/2023 1 mg nightly => 3/24/23 2 mg po at bedtime efficacy without side effects, without full remission => 6/19/23 3 mg po qam -Risks, benefits and alternatives discussed.  Patient provides verbal consent to treatment.  Restart escitalopram 4/17/23 5 mg po qday => 5/22/23 10 mg po qday -Risks, benefits and alternatives discussed.  Patient provides verbal consent to treatment.  Consider escitalopram; propranolol   DC'd venlafaxine (possible contribution to HTN)  Labs -reviewed, no further labs indicated at this time  Return in 2 to 3 weeks    []Medication refilled per  Medication Refill in Ambulatory Care  policy.  [x]Medication unable to be refilled by RN due to criteria not met as indicated below:    []Eligibility - not seen in the last year   []Supervision - no future appointment   []Compliance - no shows, cancellations or lapse in therapy   []Verification - order discrepancy   []Controlled medication   []Medication not included in policy   []90-day supply request   [x]Other out of scope of CMA

## 2023-07-25 RX ORDER — ESCITALOPRAM OXALATE 10 MG/1
10 TABLET ORAL DAILY
Qty: 30 TABLET | Refills: 1 | Status: SHIPPED | OUTPATIENT
Start: 2023-07-25 | End: 2023-07-31

## 2023-07-31 ENCOUNTER — VIRTUAL VISIT (OUTPATIENT)
Dept: PSYCHIATRY | Facility: CLINIC | Age: 58
End: 2023-07-31
Payer: COMMERCIAL

## 2023-07-31 DIAGNOSIS — F33.1 MAJOR DEPRESSIVE DISORDER, RECURRENT EPISODE, MODERATE (H): ICD-10-CM

## 2023-07-31 DIAGNOSIS — F90.0 ADHD (ATTENTION DEFICIT HYPERACTIVITY DISORDER), INATTENTIVE TYPE: Primary | ICD-10-CM

## 2023-07-31 DIAGNOSIS — F90.9 ATTENTION DEFICIT HYPERACTIVITY DISORDER (ADHD), UNSPECIFIED ADHD TYPE: ICD-10-CM

## 2023-07-31 DIAGNOSIS — F33.0 MAJOR DEPRESSIVE DISORDER, RECURRENT EPISODE, MILD (H): ICD-10-CM

## 2023-07-31 PROCEDURE — 99212 OFFICE O/P EST SF 10 MIN: CPT | Mod: VID | Performed by: PSYCHIATRY & NEUROLOGY

## 2023-07-31 RX ORDER — GUANFACINE 3 MG/1
3 TABLET, EXTENDED RELEASE ORAL EVERY MORNING
Qty: 90 TABLET | Refills: 0 | Status: SHIPPED | OUTPATIENT
Start: 2023-07-31 | End: 2023-10-02

## 2023-07-31 RX ORDER — ESCITALOPRAM OXALATE 10 MG/1
10 TABLET ORAL DAILY
Qty: 90 TABLET | Refills: 0 | Status: SHIPPED | OUTPATIENT
Start: 2023-07-31 | End: 2023-10-02

## 2023-07-31 ASSESSMENT — PAIN SCALES - GENERAL: PAINLEVEL: MILD PAIN (3)

## 2023-07-31 NOTE — PROGRESS NOTES
Virtual Visit Details    Type of service:  Video Visit     Originating Location (pt. Location): Home    Distant Location (provider location):  On-site  Platform used for Video Visit: [facility] Psychiatry Consult Note    IDENTIFICATION   Name: Kalani Sherwood   : 1965/58 year old      Sex:    @ female          Telemedicine Visit: The patient's condition can be safely assessed and treated via synchronous audio and visual telemedicine encounter.        Face to Face/patient Contact total time: 8 minutes  Pre Charting time: 0 minutes; Post charting time, communication and other activities: 2 minutes; Total time  10 minutes  10:08 AM - 10:16AM          SUBJECTIVE   Doing well. Good energy levels and focus. Anxiety is much better managed. Feels comfortable with mental organization and prioritizing tasks. Better organized around the house. No side effects. Does not have restlessness. Specifically focus and attention to detail are the issues; no issues with hyperactivity.         PHQ-9 scores:      2023    12:02 PM 2023     2:25 PM 3/15/2023     5:21 PM   PHQ-9 SCORE   PHQ-9 Total Score MyChart 14 (Moderate depression) 15 (Moderately severe depression) 7 (Mild depression)   PHQ-9 Total Score 14 15 7       CHLOE-7 scores:      10/21/2016     9:24 AM 2023     2:28 PM 2023     1:25 AM   CHLOE-7 SCORE   Total Score  6 (mild anxiety) 16 (severe anxiety)   Total Score 2 6 16       OBJECTIVE     Vital Signs:   [facility] 2016     Labs:  na    Current Medications:  Current Outpatient Medications   Medication    amLODIPine (NORVASC) 10 MG tablet    carvedilol (COREG) 3.125 MG tablet    hydrochlorothiazide (HYDRODIURIL) 25 MG tablet    escitalopram (LEXAPRO) 10 MG tablet    guanFACINE (INTUNIV) 3 MG TB24 24 hr tablet    oxybutynin ER (DITROPAN XL) 15 MG 24 hr tablet     No current facility-administered medications for this visit.        The [place] Prescription Monitoring Program has been  reviewed and there are no concerns about diversionary activity for controlled substances at this time.        ADDED HISTORY   Living in 1100 square foot house with , dogs, puppies, friend and her associates/pets.     MENTAL STATUS EXAMINATION:   Appearance: Intact attention to grooming and hygiene, casual garb; clutter with extra items scattered around different areas  Attitude: Cooperative  Eye Contact: Fair  Gait and Station: Sitting  Psychomotor Behavior: Within normal limits  Oriented to: Grossly person place and time  Attention Span and Concentration: Grossly intact  Speech: Slightly sad tone  Language: English  Mood:  good  Affect: euthymic  Associations:  no loose associations  Thought Process:  logical, linear and goal oriented  Thought Content: No evidence of delusions or suicidal or homicidal ideation plan or intent  Memory: Grossly fair  Fund of Knowledge: Intact  Insight:  good  Judgment:  intact, adequate for safety  Impulse Control:  intact        DIAGNOSES:   ADHD, unspecified type  Major depressive disorder, moderate, recurrent, in partial remission  Unspecified anxiety disorder  Rule out alcohol use disorder, moderate, in partial remission or sustained remission      ASSESSMENT:   Patient with clear history and early childhood onset of ADHD.  Does have symptomatology of major depression as well as anxiety difficulty, both of which are associated with ADHD and impacted by it.  Pursue treatment.  Given hypertension issues, utilize Intuniv.  Venlafaxine may be a contributing factor, we will plan to cross taper to SSRI.    Today Kalani CRISTELA Sherwood reports no suicidal ideations. In addition, she has notable risk factors for self-harm, including anxiety. However, risk is mitigated by commitment to family, Christianity beliefs and absence of past attempts. Therefore, based on all available evidence including the factors cited above, she does not appear to be at imminent risk for self-harm, does not meet  criteria for a 72-hr hold, and therefore remains appropriate for ongoing outpatient level of care.       PLAN:     Patient advised of consultative model. Patient will continue to be seen for ongoing consultation and stabilization.  Does not meet criteria for involuntary treatment or hospitalization  Trial Intuniv 3/3/2023 1 mg nightly => 3/24/23 2 mg po at bedtime efficacy without side effects, without full remission => 6/19/23 3 mg po qam further remission, effective, without side effects-Risks, benefits and alternatives discussed.  Patient provides verbal consent to treatment.  Restart escitalopram 4/17/23 5 mg po qday => 5/22/23 10 mg po qday -Risks, benefits and alternatives discussed.  Patient provides verbal consent to treatment.  Consider escitalopram; propranolol   DC'd venlafaxine (possible contribution to HTN)  Labs -reviewed, no further labs indicated at this time  Return in 2 to 3 weeks      Administrative Billing:   Time spent with patient was greater than 50% of time and/or significant time was spent in counseling and coordination of care regarding above diagnoses and treatment plan. Pre charting time and post charting time/documentation/coordination are done on date of service.      Signed:   Home Coughlin M.D.  [facility] Psychiatry Service    Disclaimer: This note consists of symbols derived from keyboarding, dictation and/or voice recognition software. As a result, there may be errors in the script that have gone undetected. Please consider this when interpreting information found in this chart.

## 2023-07-31 NOTE — NURSING NOTE
Is the patient currently in the state of [place]? YES    Visit mode:VIDEO    If the visit is dropped, the patient can be reconnected by: VIDEO VISIT:  Send e-mail to at [email or web address]    Will anyone else be joining the visit? No  (If patient encounters technical issues they should call [phone])    How would you like to obtain your AVS? MyChart    Are changes needed to the allergy or medication list? No    Rooming Documentation: Assigned questionnaire(s) completed .    Reason for visit: RECHJAYCEE Ramon

## 2023-08-13 DIAGNOSIS — R32 URINARY INCONTINENCE, UNSPECIFIED TYPE: ICD-10-CM

## 2023-08-14 RX ORDER — OXYBUTYNIN CHLORIDE 15 MG/1
15 TABLET, EXTENDED RELEASE ORAL DAILY
Qty: 90 TABLET | Refills: 0 | Status: SHIPPED | OUTPATIENT
Start: 2023-08-14 | End: 2023-11-20

## 2023-10-02 ENCOUNTER — VIRTUAL VISIT (OUTPATIENT)
Dept: PSYCHIATRY | Facility: CLINIC | Age: 58
End: 2023-10-02
Payer: COMMERCIAL

## 2023-10-02 DIAGNOSIS — F90.9 ATTENTION DEFICIT HYPERACTIVITY DISORDER (ADHD), UNSPECIFIED ADHD TYPE: ICD-10-CM

## 2023-10-02 DIAGNOSIS — F33.0 MAJOR DEPRESSIVE DISORDER, RECURRENT EPISODE, MILD (H): ICD-10-CM

## 2023-10-02 PROCEDURE — 99214 OFFICE O/P EST MOD 30 MIN: CPT | Mod: VID | Performed by: PSYCHIATRY & NEUROLOGY

## 2023-10-02 RX ORDER — ESCITALOPRAM OXALATE 20 MG/1
20 TABLET ORAL DAILY
Qty: 90 TABLET | Refills: 1 | Status: SHIPPED | OUTPATIENT
Start: 2023-10-02 | End: 2024-01-08

## 2023-10-02 RX ORDER — GUANFACINE 4 MG/1
4 TABLET, EXTENDED RELEASE ORAL EVERY MORNING
Qty: 30 TABLET | Refills: 3 | Status: SHIPPED | OUTPATIENT
Start: 2023-10-02 | End: 2023-10-02

## 2023-10-02 RX ORDER — GUANFACINE 4 MG/1
4 TABLET, EXTENDED RELEASE ORAL EVERY MORNING
Qty: 30 TABLET | Refills: 3 | Status: SHIPPED | OUTPATIENT
Start: 2023-10-02 | End: 2023-12-22 | Stop reason: DRUGHIGH

## 2023-10-02 ASSESSMENT — PAIN SCALES - GENERAL: PAINLEVEL: NO PAIN (0)

## 2023-10-02 NOTE — PROGRESS NOTES
Virtual Visit Details    Type of service:  Video Visit     Originating Location (pt. Location): Home    Distant Location (provider location):  On-site  Platform used for Video Visit: [facility] Psychiatry Consult Note    IDENTIFICATION   Name: Kalani Sherwood   : 1965/58 year old      Sex:    @ female          Telemedicine Visit: The patient's condition can be safely assessed and treated via synchronous audio and visual telemedicine encounter.        Face to Face/patient Contact total time: 16 minutes  Pre Charting time: 1 minutes; Post charting time, communication and other activities: 1 minutes; Total time 18 minutes  10:04 AM - 10:20AM          SUBJECTIVE   Doing okay. A lot of stressful. Anxiety and depression worse. Waking up with anxiety around 4ish am. ADHD is worse with stress. Took awhile to get pre appointment work done. Getting easily distracted.         PHQ-9 scores:      2023    12:02 PM 2023     2:25 PM 3/15/2023     5:21 PM   PHQ-9 SCORE   PHQ-9 Total Score MyChart 14 (Moderate depression) 15 (Moderately severe depression) 7 (Mild depression)   PHQ-9 Total Score 14 15 7       CHLOE-7 scores:      10/21/2016     9:24 AM 2023     2:28 PM 2023     1:25 AM   CHLOE-7 SCORE   Total Score  6 (mild anxiety) 16 (severe anxiety)   Total Score 2 6 16       OBJECTIVE     Vital Signs:   LMP 2016     Current Medications:  Current Outpatient Medications   Medication    amLODIPine (NORVASC) 10 MG tablet    carvedilol (COREG) 3.125 MG tablet    hydrochlorothiazide (HYDRODIURIL) 25 MG tablet    escitalopram (LEXAPRO) 10 MG tablet    guanFACINE HCl (INTUNIV) 3 MG TB24 24 hr tablet    oxyBUTYnin ER (DITROPAN XL) 15 MG 24 hr tablet     No current facility-administered medications for this visit.        The [place] Prescription Monitoring Program has been reviewed and there are no concerns about diversionary activity for controlled substances at this time.        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 Displaced family lived with them, not gone.     MENTAL STATUS EXAMINATION:   Appearance: Intact attention to grooming and hygiene, casual garb; clutter with extra items scattered around different areas  Attitude: Cooperative  Eye Contact: Fair  Gait and Station: Sitting  Psychomotor Behavior: Within normal limits  Oriented to: Grossly person place and time  Attention Span and Concentration: Grossly intact  Speech: sad tone  Language: English  Mood:  stressed  Affect: constricted  Associations:  no loose associations  Thought Process:  logical, linear and goal oriented  Thought Content: No evidence of delusions or suicidal or homicidal ideation plan or intent  Memory: Grossly fair  Fund of Knowledge: Intact  Insight:  good  Judgment:  intact, adequate for safety  Impulse Control:  intact        DIAGNOSES:   ADHD, unspecified type  Major depressive disorder, moderate, recurrent, in partial remission  Unspecified anxiety disorder  Rule out alcohol use disorder, moderate, in partial remission or sustained remission      ASSESSMENT:   Patient with clear history and early childhood onset of ADHD.  Does have symptomatology of major depression as well as anxiety difficulty, both of which are associated with ADHD and impacted by it.  Pursue treatment.  Given hypertension issues, utilize Intuniv.  Venlafaxine may be a contributing factor, we will plan to cross taper to SSRI.    Today Kalani Sherwood reports no suicidal ideations. In addition, she has notable risk factors for self-harm, including anxiety. However, risk is mitigated by commitment to family, Restorationist beliefs and absence of past attempts. Therefore, based on all available evidence including the factors cited above, she does not appear to be at imminent risk for self-harm, does not meet criteria for a 72-hr hold, and therefore remains appropriate for ongoing outpatient level of care.       PLAN:     Patient  advised of consultative model. Patient will continue to be seen for ongoing consultation and stabilization.  Does not meet criteria for involuntary treatment or hospitalization  Trial Intuniv 3/3/2023 1 mg nightly => 3/24/23 2 mg po at bedtime efficacy without side effects, without full remission => 6/19/23 3 mg po qam further remission, effective, without side effects, worse sx with high stress => 10/9/23 4 mg po qhs-Risks, benefits and alternatives discussed.  Patient provides verbal consent to treatment.  Restart escitalopram 4/17/23 5 mg po qday => 5/22/23 10 mg po qday bad stress/depression/anxiety => 10/2/23 20 mg po qday-Risks, benefits and alternatives discussed.  Patient provides verbal consent to treatment.  Consider escitalopram; propranolol   DC'd venlafaxine (possible contribution to HTN)  Labs -reviewed, no further labs indicated at this time  Return in 2 to 3 weeks    Administrative Billing:   Time spent with patient was greater than 50% of time and/or significant time was spent in counseling and coordination of care regarding above diagnoses and treatment plan. Pre charting time and post charting time/documentation/coordination are done on date of service.      Signed:   Home Coughlin M.D.  [facility] Psychiatry Service    Disclaimer: This note consists of symbols derived from keyboarding, dictation and/or voice recognition software. As a result, there may be errors in the script that have gone undetected. Please consider this when interpreting information found in this chart.

## 2023-10-02 NOTE — NURSING NOTE
Is the patient currently in the state of [place]? YES    Visit mode:VIDEO    If the visit is dropped, the patient can be reconnected by: VIDEO VISIT: Send to e-mail at: [email or web address]    Will anyone else be joining the visit? NO  (If patient encounters technical issues they should call [phone]    How would you like to obtain your AVS? MyChart    Are changes needed to the allergy or medication list? Pt stated no changes to allergies and Pt stated no med changes    Reason for visit: VIKY CHAMPION

## 2023-10-19 DIAGNOSIS — I10 ESSENTIAL HYPERTENSION WITH GOAL BLOOD PRESSURE LESS THAN 130/80: ICD-10-CM

## 2023-10-19 RX ORDER — HYDROCHLOROTHIAZIDE 25 MG/1
TABLET ORAL
Qty: 90 TABLET | Refills: 0 | Status: SHIPPED | OUTPATIENT
Start: 2023-10-19 | End: 2024-01-25

## 2023-10-19 RX ORDER — CARVEDILOL 3.12 MG/1
TABLET ORAL
Qty: 180 TABLET | Refills: 0 | Status: SHIPPED | OUTPATIENT
Start: 2023-10-19 | End: 2024-01-25

## 2023-10-19 RX ORDER — AMLODIPINE BESYLATE 10 MG/1
10 TABLET ORAL DAILY
Qty: 90 TABLET | Refills: 0 | Status: SHIPPED | OUTPATIENT
Start: 2023-10-19 | End: 2024-01-25

## 2023-11-19 DIAGNOSIS — R32 URINARY INCONTINENCE, UNSPECIFIED TYPE: ICD-10-CM

## 2023-11-20 ENCOUNTER — VIRTUAL VISIT (OUTPATIENT)
Dept: PSYCHIATRY | Facility: CLINIC | Age: 58
End: 2023-11-20
Payer: COMMERCIAL

## 2023-11-20 ENCOUNTER — TELEPHONE (OUTPATIENT)
Dept: PSYCHIATRY | Facility: CLINIC | Age: 58
End: 2023-11-20
Payer: COMMERCIAL

## 2023-11-20 ENCOUNTER — VIRTUAL VISIT (OUTPATIENT)
Dept: BEHAVIORAL HEALTH | Facility: CLINIC | Age: 58
End: 2023-11-20
Payer: COMMERCIAL

## 2023-11-20 DIAGNOSIS — F90.0 ADHD (ATTENTION DEFICIT HYPERACTIVITY DISORDER), INATTENTIVE TYPE: Primary | ICD-10-CM

## 2023-11-20 DIAGNOSIS — F33.0 MILD EPISODE OF RECURRENT MAJOR DEPRESSIVE DISORDER (H): Primary | ICD-10-CM

## 2023-11-20 DIAGNOSIS — F90.0 ATTENTION DEFICIT HYPERACTIVITY DISORDER (ADHD), PREDOMINANTLY INATTENTIVE TYPE: ICD-10-CM

## 2023-11-20 DIAGNOSIS — F90.9 ATTENTION DEFICIT HYPERACTIVITY DISORDER (ADHD), UNSPECIFIED ADHD TYPE: ICD-10-CM

## 2023-11-20 PROCEDURE — 90832 PSYTX W PT 30 MINUTES: CPT | Mod: 95 | Performed by: COUNSELOR

## 2023-11-20 PROCEDURE — 99214 OFFICE O/P EST MOD 30 MIN: CPT | Mod: VID | Performed by: PSYCHIATRY & NEUROLOGY

## 2023-11-20 RX ORDER — DEXTROAMPHETAMINE SACCHARATE, AMPHETAMINE ASPARTATE, DEXTROAMPHETAMINE SULFATE AND AMPHETAMINE SULFATE 2.5; 2.5; 2.5; 2.5 MG/1; MG/1; MG/1; MG/1
10 TABLET ORAL 2 TIMES DAILY
Qty: 60 TABLET | Refills: 0 | Status: SHIPPED | OUTPATIENT
Start: 2023-11-20 | End: 2024-03-11

## 2023-11-20 RX ORDER — OXYBUTYNIN CHLORIDE 15 MG/1
15 TABLET, EXTENDED RELEASE ORAL DAILY
Qty: 90 TABLET | Refills: 0 | Status: SHIPPED | OUTPATIENT
Start: 2023-11-20 | End: 2024-02-20

## 2023-11-20 RX ORDER — GUANFACINE 2 MG/1
2 TABLET, EXTENDED RELEASE ORAL AT BEDTIME
Qty: 30 TABLET | Refills: 1 | Status: SHIPPED | OUTPATIENT
Start: 2023-11-20 | End: 2023-12-18

## 2023-11-20 ASSESSMENT — ANXIETY QUESTIONNAIRES
6. BECOMING EASILY ANNOYED OR IRRITABLE: SEVERAL DAYS
2. NOT BEING ABLE TO STOP OR CONTROL WORRYING: NOT AT ALL
IF YOU CHECKED OFF ANY PROBLEMS ON THIS QUESTIONNAIRE, HOW DIFFICULT HAVE THESE PROBLEMS MADE IT FOR YOU TO DO YOUR WORK, TAKE CARE OF THINGS AT HOME, OR GET ALONG WITH OTHER PEOPLE: SOMEWHAT DIFFICULT
7. FEELING AFRAID AS IF SOMETHING AWFUL MIGHT HAPPEN: NOT AT ALL
IF YOU CHECKED OFF ANY PROBLEMS ON THIS QUESTIONNAIRE, HOW DIFFICULT HAVE THESE PROBLEMS MADE IT FOR YOU TO DO YOUR WORK, TAKE CARE OF THINGS AT HOME, OR GET ALONG WITH OTHER PEOPLE: SOMEWHAT DIFFICULT
7. FEELING AFRAID AS IF SOMETHING AWFUL MIGHT HAPPEN: NOT AT ALL
4. TROUBLE RELAXING: NOT AT ALL
3. WORRYING TOO MUCH ABOUT DIFFERENT THINGS: NOT AT ALL
5. BEING SO RESTLESS THAT IT IS HARD TO SIT STILL: NOT AT ALL
4. TROUBLE RELAXING: NOT AT ALL
3. WORRYING TOO MUCH ABOUT DIFFERENT THINGS: NOT AT ALL
GAD7 TOTAL SCORE: 2
6. BECOMING EASILY ANNOYED OR IRRITABLE: SEVERAL DAYS
GAD7 TOTAL SCORE: 2
1. FEELING NERVOUS, ANXIOUS, OR ON EDGE: SEVERAL DAYS
GAD7 TOTAL SCORE: 2
GAD7 TOTAL SCORE: 2
5. BEING SO RESTLESS THAT IT IS HARD TO SIT STILL: NOT AT ALL
2. NOT BEING ABLE TO STOP OR CONTROL WORRYING: NOT AT ALL
1. FEELING NERVOUS, ANXIOUS, OR ON EDGE: SEVERAL DAYS

## 2023-11-20 ASSESSMENT — PATIENT HEALTH QUESTIONNAIRE - PHQ9
10. IF YOU CHECKED OFF ANY PROBLEMS, HOW DIFFICULT HAVE THESE PROBLEMS MADE IT FOR YOU TO DO YOUR WORK, TAKE CARE OF THINGS AT HOME, OR GET ALONG WITH OTHER PEOPLE: VERY DIFFICULT
SUM OF ALL RESPONSES TO PHQ QUESTIONS 1-9: 10
SUM OF ALL RESPONSES TO PHQ QUESTIONS 1-9: 10

## 2023-11-20 ASSESSMENT — PAIN SCALES - GENERAL: PAINLEVEL: NO PAIN (0)

## 2023-11-20 NOTE — TELEPHONE ENCOUNTER
Prior Authorization Retail Medication Request    Medication/Dose: Amphetamine salt 10 mg tabs  Diagnosis and ICD code (if different than what is on RX):  REFUGIO  New/renewal/insurance change PA/secondary ins. PA:  Previously Tried and Failed:  NA  Rationale:  NA    Insurance   Primary: [employer]ET[employer]   Insurance ID:  [id number]     Secondary (if applicable):REFUGIO  Insurance ID:  REFUGIO    Pharmacy Information (if different than what is on RX)  Name:  [facility]  Phone:  REFUGIO  Fax:NA

## 2023-11-20 NOTE — PROGRESS NOTES
[facility]  November 20, 2023      Behavioral Health Clinician Progress Note    Patient Name: Kalani Sherwood           Service Type:  Individual      Service Location:   MyChart / Email (patient reached)     Session Start Time: 951am  Session End Time: 1014am      Session Length: 16 - 37      Attendees: Patient     Service Modality:  Video Visit:      Provider verified identity through the following two step process.  Patient provided:  Patient photo and Patient was verified at admission/transfer    Telemedicine Visit: The patient's condition can be safely assessed and treated via synchronous audio and visual telemedicine encounter.      Reason for Telemedicine Visit: Services only offered telehealth    Originating Site (Patient Location): Patient's home    Distant Site (Provider Location): [facility]ENT[facility] ADDICTION [facility]    Consent:  The patient/guardian has verbally consented to: the potential risks and benefits of telemedicine (video visit) versus in person care; bill my insurance or make self-payment for services provided; and responsibility for payment of non-covered services.     Patient would like the video invitation sent by:  My Chart    Mode of Communication:  Video Conference via [facility]    Distant Location (Provider):  On-site    As the provider I attest to compliance with applicable laws and regulations related to telemedicine.    Visit Activities (Refresh list every visit): [facility] Only    Diagnostic Assessment Date: 2/27/2023 Home Coughlin M.D.   Treatment Plan Review Date: in the next few visits  See Flowsheets for today's PHQ-9 and CHLOE-7 results  Previous PHQ-9:       2/26/2023     2:25 PM 3/15/2023     5:21 PM 11/20/2023     9:12 AM   PHQ-9 SCORE   PHQ-9 Total Score [facility] 15 (Moderately severe depression) 7 (Mild depression) 10 (Moderate depression)   PHQ-9 Total Score 15 7 10     Previous CHLOE-7:       2/26/2023      2:28 PM 5/22/2023     1:25 AM 11/20/2023     9:17 AM   CHLOE-7 SCORE   Total Score 6 (mild anxiety) 16 (severe anxiety) 2 (minimal anxiety)   Total Score 6 16 2    2       ZACKARY LEVEL:      2/24/2012    10:00 AM   ZACKARY Score (Last Two)   ZACKARY Raw Score 45   Activation Score 73.1   ZACKARY Level 4       DATA  Extended Session (60+ minutes): No  Interactive Complexity: No  Crisis: No  [facility] Patient: No    Treatment Objective(s) Addressed in This Session:  Target Behavior(s):  cope with stress, avoidance, ADHD symptoms    Anxiety: will experience a reduction in anxiety, will develop more effective coping skills to manage anxiety symptoms, and will increase ability to function adaptively  Attention Problems: will develop coping skills to effectively manage attention issues    Current Stressors / Issues:  Medication Questions/Requests: wish there was something that is more of a stimulant option, Adderall was helpful in the past, not sure if it is a good option      update: Pt reports that it is difficult to emi since life has changed since then. Their  has started construction work and he is gone every 2 weeks at a time. The things that they enjoy are now stressful and difficult and feel like tasks they have to do. Pt is having to do a lot more since they don't have their partner. Pt isn't wanting to go to sleep to avoid tomorrow or is sleeping a lot. She has their dogs to take care of.   Stressors: life changes   Side Effects: no    Appetite: under control, have to be careful of stress eating   Sleep: avoid sleep to sleeping to avoid tomorrow, Monday's are day off and pt had to push themselves to get out of bed to take care of their dogs     Suicidality: pt denies, pt denies lifetime   Self-harm: pt denies  Substance Use: cannabis use at night     Therapist: no therapist they meet with regularly, is one more thing to add on to a busy time, wanting to hold off on this at this time    Interventions: ADHD resources        Progress on Treatment Objective(s) / Homework:  Worsening - ACTION (Actively working towards change); Intervened by reinforcing change plan / affirming steps taken    CBT: Pt reports that the things they enjoy now feel like tasks. They are avoiding sleep at times to prevent tomorrow from coming. Pt is trying to do what they can to cope.     CBT/MI: [facility] normalizes the human condition of avoidance and that this is natural when we are stressed or feel over whelmed. [facility] encourages pt to play with pets and talk with them as a way to cope and process. [facility] reviews skills to help with ADHD symptoms, mindfulness, timers and encourages pt to break up things into smaller tasks.     Motivational Interviewing    MI Intervention: Co-Developed Goal: improve focus and reduce avoidance, Expressed Empathy/Understanding, Supported Autonomy, Collaboration, Evocation, Permission to raise concern or advise, Open-ended questions, Reflections: simple and complex, Change talk (evoked), and Reframe     Change Talk Expressed by the Patient: Committment to change Activation Taking steps    Provider Response to Change Talk: E - Evoked more info from patient about behavior change, A - Affirmed patient's thoughts, decisions, or attempts at behavior change, R - Reflected patient's change talk, and S - Summarized patient's change talk statements    Also provided psychoeducation about behavioral health condition, symptoms, and treatment options    CBT/MI and Processing: [facility] explains that it is part of the human condition to avoid things that we don't like. It is normal to feel over whelmed when you are doing everything. [facility] encourages pt to break up tasks to make them easier to take on. [facility] will send pt ADHD resources through [employer].     Assessments completed prior to visit:  The following assessments were completed by patient for this visit:  PHQ9:       2/24/2012     9:30 AM 10/21/2016     9:24 AM 12/20/2022    12:10 PM 1/24/2023    12:02  PM 2/26/2023     2:25 PM 3/15/2023     5:21 PM 11/20/2023     9:12 AM   PHQ-9 SCORE   PHQ-9 Total Score 1         PHQ-9 Total Score [place]   9 (Mild depression) 14 (Moderate depression) 15 (Moderately severe depression) 7 (Mild depression) 10 (Moderate depression)   PHQ-9 Total Score  2 9 14 15 7 10     GAD2:       3/15/2023     5:22 PM 4/16/2023     2:12 PM 5/22/2023     1:25 AM 7/24/2023     9:55 AM 10/1/2023     7:58 PM 11/20/2023     9:17 AM   CHLOE-2   Feeling nervous, anxious, or on edge 1 1 3 1 1 2    2   Not being able to stop or control worrying 0 0 0 1 1 1    1   CHLOE-2 Total Score 1 1 3 2 2 3    3       Care Plan review completed: No    Medication Review:  No changes to current psychiatric medication(s)    Medication Compliance:  Yes    Changes in Health Issues:   None reported    Chemical Use Review:   Substance Use: Chemical use reviewed, no active concerns identified      Tobacco Use: No current tobacco use.      Assessment: Current Emotional / Mental Status (status of significant symptoms):  Risk status (Self / Other harm or suicidal ideation)  Patient denies a history of suicidal ideation, suicide attempts, self-injurious behavior, homicidal ideation, homicidal behavior, and and other safety concerns  Patient denies current fears or concerns for personal safety.  Patient denies current or recent suicidal ideation or behaviors.  Patient denies current or recent homicidal ideation or behaviors.  Patient denies current or recent self injurious behavior or ideation.  Patient denies other safety concerns.  A safety and risk management plan has not been developed at this time, however patient was encouraged to call [place] [phone] should there be a change in any of these risk factors.    Appearance:   Appropriate   Eye Contact:   Good   Psychomotor Behavior: Normal   Attitude:   Cooperative  Pleasant  Orientation:   All  Speech   Rate / Production: Normal    Volume:  Normal   Mood:    Anxious  Sad    Affect:    Subdued   Thought Content:  Clear   Thought Form:  Coherent  Logical   Insight:    Good     Diagnoses:  1. Mild episode of recurrent major depressive disorder (H24)    2. Attention deficit hyperactivity disorder (ADHD), predominantly inattentive type      Collateral Reports Completed:  Communicated with: Home Coughlin M.D.     Plan: (Homework, other):  Patient was given information about behavioral services and encouraged to schedule a follow up appointment with the clinic [facility] in 1 month.  She was also given information about mental health symptoms and treatment options .  CD Recommendations: No indications of CD issues. [facility] will provide ADHD resources through [employer].     Radha Roberts, [facility]    ______________________________________________________________________    Integrated Primary Care Behavioral Health Treatment Plan    Patient's Name: Kalani Sherwood  YOB: 1965    Date of Creation: in the next few visits  Date Treatment Plan Last Reviewed/Revised: in the next few visits    DSM5 Diagnoses:   Mild episode of recurrent major depressive disorder (H24)    Attention deficit hyperactivity disorder (ADHD), predominantly inattentive type      Psychosocial / Contextual Factors:  and dog shows, ADHD concerns, ,  is on the road and away 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    Anticipated number of session for this episode of care: 5-6  Anticipation frequency of session: Monthly  Anticipated Duration of each session: 16-37 minutes  Treatment plan  will be reviewed in 90 days or when goals have been changed.         Patient has reviewed and agreed to the above plan.      ANTHONY Ruiz  November 20, 2023

## 2023-11-20 NOTE — NURSING NOTE
Is the patient currently in the state of [place]? YES    Visit mode:VIDEO    If the visit is dropped, the patient can be reconnected by: VIDEO VISIT: Send to e-mail at: [email or web address]    Will anyone else be joining the visit? NO  (If patient encounters technical issues they should call [phone]    How would you like to obtain your AVS? MyChart    Are changes needed to the allergy or medication list? Pt stated no changes to allergies and Pt stated no med changes    Reason for visit: VIKY CHAMPION

## 2023-11-20 NOTE — Clinical Note
Brooklyn,  FYI Intuniv did not work out and I have started Adderall.  She does take cannabis, she agreed to discontinue if he were uncomfortable with the idea of controlled substances and cannabis.  She will check her blood pressures.  Sincerely, Home Coughlin M.D. Consultative Psychiatrist Program Medical Director, Lead [facility] Psychiatry Service

## 2023-11-20 NOTE — PATIENT INSTRUCTIONS
"Patient Education   Collaborative Care Psychiatry Service  What to Expect  Here's what to expect from your Collaborative Care Psychiatry Service (CCPS).   About CCPS  CCPS means 2 people work together to help you get better. You'll meet with a behavioral health clinician and a psychiatric doctor. A behavioral health clinician helps people with mental health problems by talking with them. A psychiatric doctor helps people by giving them medicine.  How it works  At every visit, you'll see the behavioral health clinician (BHC) first. They'll talk with you about how you're doing and teach you how to feel better.   Then you'll see the psychiatric doctor. This doctor can help you deal with troubling thoughts and feelings by giving you medicine. They'll make sure you know the plan for your care.   CCPS usually takes 3 to 6 visits. If you need more visits, we may have you start seeing a different psychiatric doctor for ongoing care.  If you have any questions or concerns, we'll be glad to talk with you.  About visits  Be open  At your visits, please talk openly about your problems. It may feel hard, but it's the best way for us to help you.  Cancelling visits  If you can't come to your visit, please call us right away at [phone]. If you don't cancel at least 24 hours (1 full day) before your visit, that's \"late cancellation.\"  Being late to visits  Being very late is the same as not showing up. You will be a \"no show\" if:  Your appointment starts with a BHC, and you're more than 15 minutes late for a 30-minute (half hour) visit. This will also cancel your appointment with the psychiatric doctor.  Your appointment is with a psychiatric doctor only, and you're more than 15 minutes late for a 30-minute (half hour) visit.  Your appointment is with a psychiatric doctor only, and you're more than 30 minutes late for a 60-minute (full hour) visit.  If you cancel late or don't show up 2 times within 6 months, we may end your " care.   Getting help between visits  If you need help between visits, you can call us Monday to Friday from 8 a.m. to 4:30 p.m. at [phone].  Emergency care  Call 911 or go to the nearest emergency department if your life or someone else's life is in danger.  Call 988 anytime to reach the national Suicide and Crisis hotline.  Medicine refills  To refill your medicine, call your pharmacy. You can also call [facility] at [phone], Monday to Friday, 8 a.m. to 4:30 p.m. It can take 1 to 3 business days to get a refill.   Forms, letters, and tests  You may have papers to fill out, like FMLA, short-term disability, and workability. We can help you with these forms at your visits, but you must have an appointment. You may need more than 1 visit for this, to be in an intensive therapy program, or both.  Before we can give you medicine for ADHD, we may refer you to get tested for it or confirm it another way.  We may not be able to give you an emotional support animal letter.  We don't do mental health checks ordered by the court.   We don't do mental health testing, but we can refer you to get tested.   Thank you for choosing us for your care.  For informational purposes only. Not to replace the advice of your health care provider. Copyright   2022 [facility]. All rights reserved. [employer] [id number] - 12/22.

## 2023-11-20 NOTE — PROGRESS NOTES
Virtual Visit Details    Type of service:  Video Visit     Originating Location (pt. Location): Home    Distant Location (provider location):  On-site  Platform used for Video Visit: [facility] Psychiatry Consult Note    IDENTIFICATION   Name: Kalani Sherwood   : 1965/58 year old      Sex:    @ female          Telemedicine Visit: The patient's condition can be safely assessed and treated via synchronous audio and visual telemedicine encounter.        Face to Face/patient Contact total time: 20 minutes  Pre Charting time: 1 minutes; Post charting time, communication and other activities: 1 minutes; Total time 22 minutes  10:21 AM -10:41 AM          SUBJECTIVE   Slight improvements with intuniv. Has not really struggled with restlessness or fidgeting. Inattentive was really the issue. Recalls comments on what took her so long, or not completing task. Intuniv helping with incontinence - no incontinence between intuniv and oxybutinin.       The following assessments were completed by patient for this visit:  PHQ9:       2012     9:30 AM 10/21/2016     9:24 AM 2022    12:10 PM 2023    12:02 PM 2023     2:25 PM 3/15/2023     5:21 PM 2023     9:12 AM   PHQ-9 SCORE   PHQ-9 Total Score 1         PHQ-9 Total Score MyChart   9 (Mild depression) 14 (Moderate depression) 15 (Moderately severe depression) 7 (Mild depression) 10 (Moderate depression)   PHQ-9 Total Score  2 9 14 15 7 10     GAD7:       2012    10:03 AM 10/21/2016     9:24 AM 2023     2:28 PM 2023     1:25 AM 2023     9:17 AM   CHLOE-7 SCORE   Total Score 2       Total Score   6 (mild anxiety) 16 (severe anxiety) 2 (minimal anxiety)   Total Score  2 6 16 2    2     PROMIS 10-Global Health (only subscores and total score):       2023     3:11 PM 3/15/2023     5:23 PM 2023    11:26 PM 10/1/2023     8:00 PM   PROMIS-10 Scores Only   Global Mental Health Score 9 10 9 9   Global Physical  Health Score 14 15 15 14   PROMIS TOTAL - SUBSCORES 23 25 24 23             2/26/2023     2:25 PM 3/15/2023     5:21 PM 11/20/2023     9:12 AM   PHQ   PHQ-9 Total Score 15 7 10   Q9: Thoughts of better off dead/self-harm past 2 weeks Not at all Not at all Not at all          2/26/2023     2:28 PM 5/22/2023     1:25 AM 11/20/2023     9:17 AM   CHLOE-7 SCORE   Total Score 6 (mild anxiety) 16 (severe anxiety) 2 (minimal anxiety)   Total Score 6 16 2    2        OBJECTIVE     Vital Signs:   [facility] 09/12/2016     Labs:  N/A    Current Medications:  Current Outpatient Medications   Medication    amLODIPine (NORVASC) 10 MG tablet    carvedilol (COREG) 3.125 MG tablet    escitalopram (LEXAPRO) 20 MG tablet    guanFACINE HCl (INTUNIV) 4 MG TB24    hydrochlorothiazide (HYDRODIURIL) 25 MG tablet    oxyBUTYnin ER (DITROPAN XL) 15 MG 24 hr tablet     No current facility-administered medications for this visit.        The [place] Prescription Monitoring Program has been reviewed and there are no concerns about diversionary activity for controlled substances at this time.        ADDED HISTORY   Was given a friend's adderall in past - felt very focused, able to create a list and work through it. Felt drive and organization.     Recalls drinking in college, binging and not a part of her life as functioning adult. Reports was about the same. Enjoys occasional glass of wine. No hx of psychosocial dysfunction.     Asked about DWI - reports young and dumb at 23. Tried cocaine in college and did not like.     Father had heart infection.       MENTAL STATUS EXAMINATION:   Appearance: Intact attention to grooming and hygiene, casual garb; clutter with extra items scattered around different areas  Attitude: Cooperative  Eye Contact: good  Gait and Station: Sitting  Psychomotor Behavior: Within normal limits  Oriented to: Grossly person place and time  Attention Span and Concentration: Grossly intact  Speech: sad tone  Language: English  Mood:   sad  Affect: constricted  Associations:  no loose associations  Thought Process:  logical, linear and goal oriented  Thought Content: No evidence of delusions or suicidal or homicidal ideation plan or intent  Memory: Grossly fair  Fund of Knowledge: Intact  Insight:  good  Judgment:  intact, adequate for safety  Impulse Control:  intact        DIAGNOSES:   ADHD, inattentive type  Major depressive disorder, moderate, recurrent, in partial remission  Unspecified anxiety disorder        ASSESSMENT:   Patient with clear history and early childhood onset of ADHD.  Does have symptomatology of major depression as well as anxiety difficulty, both of which are associated with ADHD and impacted by it.  Pursue treatment.  Given hypertension issues, utilize Intuniv.  Venlafaxine may be a contributing factor, we will plan to cross taper to SSRI.  Utilize stimulant medication, Intuniv trial has failed    Today Kalani ARCHIBALD Juan Joseanna reports no suicidal ideations. In addition, she has notable risk factors for self-harm, including anxiety. However, risk is mitigated by commitment to family, Sabianist beliefs and absence of past attempts. Therefore, based on all available evidence including the factors cited above, she does not appear to be at imminent risk for self-harm, does not meet criteria for a 72-hr hold, and therefore remains appropriate for ongoing outpatient level of care.       PLAN:     Patient advised of consultative model. Patient will continue to be seen for ongoing consultation and stabilization.  Does not meet criteria for involuntary treatment or hospitalization  Trial Intuniv 3/3/2023 1 mg nightly => 3/24/23 2 mg po at bedtime efficacy without side effects, without full remission => 6/19/23 3 mg po qam further remission, effective, without side effects, worse sx with high stress => 10/9/23 4 mg po qhs => 11/20/2023 2 mg nightly-Risks, benefits and alternatives discussed.  Patient provides verbal consent to  treatment.  Add Adderall 11/20/23 10 mg p.o. twice daily-Risks, benefits and alternatives discussed.  Patient provides verbal consent to treatment.  Advised of cardiac effects, addiction.  Follow-up blood pressure.  Agreed to discontinue cannabis use if PCP not comfortable with controlled substance and cannabis.  Restart escitalopram 4/17/23 5 mg po qday => 5/22/23 10 mg po qday bad stress/depression/anxiety => 10/2/23 20 mg po qday-Risks, benefits and alternatives discussed.  Patient provides verbal consent to treatment.  Consider escitalopram; propranolol   DC'd venlafaxine (possible contribution to HTN)  Labs -reviewed, no further labs indicated at this time  Return in 2 to 3 weeks    Administrative Billing:   Time spent with patient was greater than 50% of time and/or significant time was spent in counseling and coordination of care regarding above diagnoses and treatment plan. Pre charting time and post charting time/documentation/coordination are done on date of service.      Signed:   Home Coughlin M.D.  [facility] Psychiatry Service    Disclaimer: This note consists of symbols derived from keyboarding, dictation and/or voice recognition software. As a result, there may be errors in the script that have gone undetected. Please consider this when interpreting information found in this chart.

## 2023-11-20 NOTE — TELEPHONE ENCOUNTER
"Refill request r'cd from [facility] via fax for escitalopram (LEXAPRO) 10 MG tablet  denied due to this dosage has been discontinued. Escitalopram 20 mg Rx'd on 10/2/23 Qty: 90 Rx;1. Faxed \"not authorized\" back to pharmacy.       Disp Refills Start End MATTI   escitalopram (LEXAPRO) 20 MG tablet 90 tablet 1 10/2/2023  No   Sig - Route: Take 1 tablet (20 mg) by mouth daily - Oral   Sent to pharmacy as: Escitalopram Oxalate 20 MG Oral Tablet (LEXAPRO)   Class: E-Prescribe   Order: [id number]     "

## 2023-11-21 ASSESSMENT — COLUMBIA-SUICIDE SEVERITY RATING SCALE - C-SSRS
TOTAL  NUMBER OF ABORTED OR SELF INTERRUPTED ATTEMPTS LIFETIME: NO
1. HAVE YOU WISHED YOU WERE DEAD OR WISHED YOU COULD GO TO SLEEP AND NOT WAKE UP?: NO
ATTEMPT LIFETIME: NO
6. HAVE YOU EVER DONE ANYTHING, STARTED TO DO ANYTHING, OR PREPARED TO DO ANYTHING TO END YOUR LIFE?: NO
2. HAVE YOU ACTUALLY HAD ANY THOUGHTS OF KILLING YOURSELF?: NO
TOTAL  NUMBER OF INTERRUPTED ATTEMPTS LIFETIME: NO

## 2023-11-24 NOTE — TELEPHONE ENCOUNTER
Prior Authorization Approval    Medication: AMPHETAMINE-DEXTROAMPHETAMINE 10 MG PO TABS  Authorization Effective Date: 11/24/2023  Authorization Expiration Date: 11/23/2024  Insurance Company: [employer] - Phone [phone] Fax [phone]  Which Pharmacy is filling the prescription: [facility] [id number] [place] [facility] [address]  Pharmacy Notified: YES  Patient Notified: YES (pharmacy will notify the patient when ready)

## 2023-11-24 NOTE — TELEPHONE ENCOUNTER
Central Prior Authorization Team   Phone: [phone]    PA Initiation    Medication: AMPHETAMINE-DEXTROAMPHETAMINE 10 MG PO TABS  Insurance Company: [employer] - Phone [phone] Fax [phone]  Pharmacy Filling the [facility] [id number] [place] [facility] [place] - [address]  Filling Pharmacy Phone: [phone]  Filling Pharmacy Fax:    Start Date: 11/24/2023

## 2023-12-22 ENCOUNTER — TELEPHONE (OUTPATIENT)
Dept: PSYCHIATRY | Facility: CLINIC | Age: 58
End: 2023-12-22
Payer: COMMERCIAL

## 2023-12-22 DIAGNOSIS — F90.0 ADHD (ATTENTION DEFICIT HYPERACTIVITY DISORDER), INATTENTIVE TYPE: ICD-10-CM

## 2023-12-22 RX ORDER — GUANFACINE 2 MG/1
2 TABLET, EXTENDED RELEASE ORAL AT BEDTIME
Qty: 30 TABLET | Refills: 0 | Status: SHIPPED | OUTPATIENT
Start: 2023-12-22 | End: 2024-01-08

## 2023-12-22 NOTE — TELEPHONE ENCOUNTER
Refill request r'cd from [facility] via fax for INTUNIV 4 mg .  Pt has two active prescriptions for INTUNIV written for 2 mg Q HS and 4 mg Q HS. As per last treatment plan appears that pt should be going back down to 2 mg Q HS. Dialed pt and left VM asking to reply to [employer] message. Sending to provider for dose clarification       As per chart review:  Last Rx for guanFACINE HCl (INTUNIV) 4 MG TB24 provided on 10/2/23 Qty: 30 Rx: 3. This should last until February 2024.  Rx for guanFACINE (INTUNIV) 2 MG TB24 24 hr tablet  sent off to [facility] on 12/12/23 Qty: 90 Rx: 1  As per last treatment plan on 11/20/23 :    Trial Intuniv 3/3/2023 1 mg nightly => 3/24/23 2 mg po at bedtime efficacy without side effects, without full remission => 6/19/23 3 mg po qam further remission, effective, without side effects, worse sx with high stress => 10/9/23 4 mg po qhs => 11/20/2023 2 mg nightly-Risks, benefits and alternatives discussed.  Patient provides verbal consent to treatment.

## 2023-12-22 NOTE — TELEPHONE ENCOUNTER
Correct - intuniv reduced to 2 mg. 4 mg rx cancelled. Left voice message of dose change, refilled 2 mg dose. Left message for pharmacy. No further action necessary.

## 2023-12-27 ENCOUNTER — MYC REFILL (OUTPATIENT)
Dept: PSYCHIATRY | Facility: CLINIC | Age: 58
End: 2023-12-27
Payer: COMMERCIAL

## 2023-12-27 NOTE — TELEPHONE ENCOUNTER
CRISTHIAN received faxed refill request for guanFACINE (INTUNIV) 2 MG TB24 24 hr tablet   Too soon to refill     Rosio Jacobson CMA December 27, 2023

## 2024-01-08 ENCOUNTER — VIRTUAL VISIT (OUTPATIENT)
Dept: BEHAVIORAL HEALTH | Facility: CLINIC | Age: 59
End: 2024-01-08
Payer: COMMERCIAL

## 2024-01-08 ENCOUNTER — VIRTUAL VISIT (OUTPATIENT)
Dept: PSYCHIATRY | Facility: CLINIC | Age: 59
End: 2024-01-08
Payer: COMMERCIAL

## 2024-01-08 DIAGNOSIS — F90.0 ATTENTION DEFICIT HYPERACTIVITY DISORDER (ADHD), PREDOMINANTLY INATTENTIVE TYPE: ICD-10-CM

## 2024-01-08 DIAGNOSIS — F33.0 MILD EPISODE OF RECURRENT MAJOR DEPRESSIVE DISORDER (H): Primary | ICD-10-CM

## 2024-01-08 DIAGNOSIS — F90.0 ADHD (ATTENTION DEFICIT HYPERACTIVITY DISORDER), INATTENTIVE TYPE: Primary | ICD-10-CM

## 2024-01-08 DIAGNOSIS — F33.0 MAJOR DEPRESSIVE DISORDER, RECURRENT EPISODE, MILD (H): ICD-10-CM

## 2024-01-08 PROCEDURE — 99214 OFFICE O/P EST MOD 30 MIN: CPT | Mod: 95 | Performed by: PSYCHIATRY & NEUROLOGY

## 2024-01-08 PROCEDURE — 90832 PSYTX W PT 30 MINUTES: CPT | Mod: 95 | Performed by: COUNSELOR

## 2024-01-08 RX ORDER — ESCITALOPRAM OXALATE 20 MG/1
20 TABLET ORAL DAILY
Qty: 90 TABLET | Refills: 1 | Status: SHIPPED | OUTPATIENT
Start: 2024-01-08 | End: 2024-03-11

## 2024-01-08 RX ORDER — DEXTROAMPHETAMINE SACCHARATE, AMPHETAMINE ASPARTATE, DEXTROAMPHETAMINE SULFATE AND AMPHETAMINE SULFATE 2.5; 2.5; 2.5; 2.5 MG/1; MG/1; MG/1; MG/1
10 TABLET ORAL 2 TIMES DAILY
Qty: 60 TABLET | Refills: 0 | Status: SHIPPED | OUTPATIENT
Start: 2024-02-08 | End: 2024-03-09

## 2024-01-08 RX ORDER — GUANFACINE 2 MG/1
2 TABLET, EXTENDED RELEASE ORAL AT BEDTIME
Qty: 90 TABLET | Refills: 1 | Status: SHIPPED | OUTPATIENT
Start: 2024-01-08 | End: 2024-05-20

## 2024-01-08 RX ORDER — DEXTROAMPHETAMINE SACCHARATE, AMPHETAMINE ASPARTATE, DEXTROAMPHETAMINE SULFATE AND AMPHETAMINE SULFATE 2.5; 2.5; 2.5; 2.5 MG/1; MG/1; MG/1; MG/1
10 TABLET ORAL 2 TIMES DAILY
Qty: 60 TABLET | Refills: 0 | Status: SHIPPED | OUTPATIENT
Start: 2024-03-10 | End: 2024-04-09

## 2024-01-08 RX ORDER — DEXTROAMPHETAMINE SACCHARATE, AMPHETAMINE ASPARTATE, DEXTROAMPHETAMINE SULFATE AND AMPHETAMINE SULFATE 2.5; 2.5; 2.5; 2.5 MG/1; MG/1; MG/1; MG/1
10 TABLET ORAL 2 TIMES DAILY
Qty: 60 TABLET | Refills: 0 | Status: SHIPPED | OUTPATIENT
Start: 2024-01-08 | End: 2024-02-07

## 2024-01-08 ASSESSMENT — PATIENT HEALTH QUESTIONNAIRE - PHQ9
SUM OF ALL RESPONSES TO PHQ QUESTIONS 1-9: 1
SUM OF ALL RESPONSES TO PHQ QUESTIONS 1-9: 1
10. IF YOU CHECKED OFF ANY PROBLEMS, HOW DIFFICULT HAVE THESE PROBLEMS MADE IT FOR YOU TO DO YOUR WORK, TAKE CARE OF THINGS AT HOME, OR GET ALONG WITH OTHER PEOPLE: NOT DIFFICULT AT ALL

## 2024-01-08 NOTE — PROGRESS NOTES
Virtual Visit Details    Type of service:  Video Visit     Originating Location (pt. Location): Home    Distant Location (provider location):  On-site  Platform used for Video Visit: [facility] Psychiatry Consult Note    IDENTIFICATION   Name: Kalani Sherwood   : 1965/58 year old      Sex:    @ female          Telemedicine Visit: The patient's condition can be safely assessed and treated via synchronous audio and visual telemedicine encounter.        Face to Face/patient Contact total time: 12 minutes  Pre Charting time: 3 minutes; Post charting time, communication and other activities: 1 minutes; Total time 16 minutes  10:16 AM -10:28 AM          SUBJECTIVE   Takes adderall 530a or 6am. Then tries to take 1 pm but forgetting. Wears off around noonish. Noticing a significant difference. Already has a plan about what she needs to accomplish at home. No longer dreading going home to face tasks. Always liked to eat in general and with boredom and stress. Adderall helping. Stomach upset initially, completely resolved.     Incontinence relief continues with intuniv.       The following assessments were completed by patient for this visit:  PROMIS 10-Global Health (only subscores and total score):       2023     3:11 PM 3/15/2023     5:23 PM 2023    11:26 PM 10/1/2023     8:00 PM 2024     9:16 AM   PROMIS-10 Scores Only   Global Mental Health Score 9 10 9 9 14    14   Global Physical Health Score 14 15 15 14 16    16   PROMIS TOTAL - SUBSCORES 23 25 24 23 30    30             3/15/2023     5:21 PM 2023     9:12 AM 2024     9:14 AM   PHQ   PHQ-9 Total Score 7 10 1   Q9: Thoughts of better off dead/self-harm past 2 weeks Not at all Not at all Not at all          2023     2:28 PM 2023     1:25 AM 2023     9:17 AM   CHLOE-7 SCORE   Total Score 6 (mild anxiety) 16 (severe anxiety) 2 (minimal anxiety)   Total Score 6 16 2    2        OBJECTIVE     Vital Signs:   LMP  09/12/2016     Labs:  na    Current Medications:  Current Outpatient Medications   Medication    amLODIPine (NORVASC) 10 MG tablet    amphetamine-dextroamphetamine (ADDERALL) 10 MG tablet    carvedilol (COREG) 3.125 MG tablet    escitalopram (LEXAPRO) 20 MG tablet    guanFACINE (INTUNIV) 2 MG TB24 24 hr tablet    hydrochlorothiazide (HYDRODIURIL) 25 MG tablet    oxyBUTYnin ER (DITROPAN XL) 15 MG 24 hr tablet     No current facility-administered medications for this visit.        The [place] Prescription Monitoring Program has been reviewed and there are no concerns about diversionary activity for controlled substances at this time.        ADDED HISTORY   Reduced cannabis use.    MENTAL STATUS EXAMINATION:   Appearance: Intact attention to grooming and hygiene, casual garb; clutter with extra items scattered around different areas  Attitude: Cooperative  Eye Contact: good  Gait and Station: Sitting  Psychomotor Behavior: Within normal limits  Oriented to: Grossly person place and time  Attention Span and Concentration: Grossly intact  Speech: Normal tone  Language: English  Mood: Good  Affect: Euthymic  Associations:  no loose associations  Thought Process:  logical, linear and goal oriented  Thought Content: No evidence of delusions or suicidal or homicidal ideation plan or intent  Memory: Grossly fair  Fund of Knowledge: Intact  Insight:  good  Judgment:  intact, adequate for safety  Impulse Control:  intact        DIAGNOSES:   ADHD, inattentive type  Major depressive disorder, moderate, recurrent, in partial remission  Unspecified anxiety disorder        ASSESSMENT:   Patient with clear history and early childhood onset of ADHD.  Does have symptomatology of major depression as well as anxiety difficulty, both of which are associated with ADHD and impacted by it.  Pursue treatment.  Given hypertension issues, utilize Intuniv.  Venlafaxine may be a contributing factor, we will plan to cross taper to SSRI.  Utilize  stimulant medication, Intuniv trial has failed    Today Kalani Sherwood reports no suicidal ideations. In addition, she has notable risk factors for self-harm, including anxiety. However, risk is mitigated by commitment to family, Confucianist beliefs and absence of past attempts. Therefore, based on all available evidence including the factors cited above, she does not appear to be at imminent risk for self-harm, does not meet criteria for a 72-hr hold, and therefore remains appropriate for ongoing outpatient level of care.       PLAN:     Patient advised of consultative model. Patient will continue to be seen for ongoing consultation and stabilization.  Does not meet criteria for involuntary treatment or hospitalization  Trial Intuniv 3/3/2023 1 mg nightly => 3/24/23 2 mg po at bedtime efficacy without side effects, without full remission => 6/19/23 3 mg po qam further remission, effective, without side effects, worse sx with high stress => 10/9/23 4 mg po qhs => 11/20/2023 2 mg nightly efficacy for sleep and incontinence-Risks, benefits and alternatives discussed.  Patient provides verbal consent to treatment.  Add Adderall 11/20/23 10 mg p.o. twice daily efficacy, each dose lasting 6 hours perhaps, tolerated well-Risks, benefits and alternatives discussed.  Patient provides verbal consent to treatment.  Advised of cardiac effects, addiction.  Follow-up blood pressure.  Agreed to discontinue cannabis use if PCP not comfortable with controlled substance and cannabis.  Restart escitalopram 4/17/23 5 mg po qday => 5/22/23 10 mg po qday bad stress/depression/anxiety => 10/2/23 20 mg po qday-Risks, benefits and alternatives discussed.  Patient provides verbal consent to treatment.  Consider escitalopram; propranolol   DC'd venlafaxine (possible contribution to HTN)  Labs -reviewed, no further labs indicated at this time  Return in 2 to 3 weeks    Administrative Billing:   Time spent with patient was greater than 50% of  time and/or significant time was spent in counseling and coordination of care regarding above diagnoses and treatment plan. Pre charting time and post charting time/documentation/coordination are done on date of service.      Signed:   Home Coughlin M.D.  [facility] Psychiatry Service    Disclaimer: This note consists of symbols derived from keyboarding, dictation and/or voice recognition software. As a result, there may be errors in the script that have gone undetected. Please consider this when interpreting information found in this chart.    eoc

## 2024-01-08 NOTE — PROGRESS NOTES
[facility]  1/8/2024      Behavioral Health Clinician Progress Note    Patient Name: Kalani Sherwood           Service Type:  Individual      Service Location:   MyChart / Email (patient reached)     Session Start Time: 954am  Session End Time: 1010am      Session Length: 16 - 37    Attendees: Patient     Service Modality:  Video Visit:      Provider verified identity through the following two step process.  Patient provided:  Patient photo and Patient was verified at admission/transfer    Telemedicine Visit: The patient's condition can be safely assessed and treated via synchronous audio and visual telemedicine encounter.      Reason for Telemedicine Visit: Services only offered telehealth    Originating Site (Patient Location): Patient's home    Distant Site (Provider Location): [facility] MENTAL [facility] & ADDICTION [facility]    Consent:  The patient/guardian has verbally consented to: the potential risks and benefits of telemedicine (video visit) versus in person care; bill my insurance or make self-payment for services provided; and responsibility for payment of non-covered services.     Patient would like the video invitation sent by:  My Chart    Mode of Communication:  Video Conference via [facility]    Distant Location (Provider):  On-site    As the provider I attest to compliance with applicable laws and regulations related to telemedicine.    Visit Activities (Refresh list every visit): [facility] Only    Diagnostic Assessment Date: 2/27/2023 Home Coughlin M.D.   Treatment Plan Review Date: 4/8/2024  See Flowsheets for today's PHQ-9 and CHLOE-7 results  Previous PHQ-9:       3/15/2023     5:21 PM 11/20/2023     9:12 AM 1/8/2024     9:14 AM   PHQ-9 SCORE   PHQ-9 Total Score [facility] 7 (Mild depression) 10 (Moderate depression) 1 (Minimal depression)   PHQ-9 Total Score 7 10 1     Previous CHLOE-7:       2/26/2023     2:28 PM 5/22/2023     1:25 AM  11/20/2023     9:17 AM   CHLOE-7 SCORE   Total Score 6 (mild anxiety) 16 (severe anxiety) 2 (minimal anxiety)   Total Score 6 16 2    2   See CHLOE-2.     ZACKARY LEVEL:      2/24/2012    10:00 AM   ZACKARY Score (Last Two)   ZACKARY Raw Score 45   Activation Score 73.1   ZACKARY Level 4       DATA  Extended Session (60+ minutes): No  Interactive Complexity: No  Crisis: No  [facility] Patient: No    Treatment Objective(s) Addressed in This Session:  Target Behavior(s):  cope with stress and ADHD symptoms symptoms and continue structure      Anxiety: will experience a reduction in anxiety, will develop more effective coping skills to manage anxiety symptoms, and will increase ability to function adaptively  Attention Problems: will develop coping skills to effectively manage attention issues    Current Stressors / Issues:  Medication Questions/Requests: not really    MH update: Pt is getting structure and routine going and that has made things easier. ADHD medication has helped a lot. It gets easier to push to do things they need to do. They feel like the medication wears off quickly and are still in the adjustment phase. She ran out of her medication before this visit. There are days where pt forgot to take the medication in the 2nd day due to work. Take first dose between 530-6am in the morning and are working to figure out when to take the 2nd dose. Pt has extra dogs and 4 people staying at their home and they were able to get things ready and were looking forward to it and weren't dreading it.   Stressors: none, stressors have become less  Side Effects: none    Appetite: fine, has tapered off a little bit, fighting urge to stress eat, medication has been helping the compulsive stress eating  Sleep: the dread of facing the next day isn't there and has improved, getting enough sleep     Substance Use: cannabis has lessened at night  Caffeine: tried to not drink caffeine due to BP and finds caffeine helps with ADHD aspects and is working to  find the balance   Therapist: holding off on therapy and feeling like [facility] visits are beneficial        Progress on Treatment Objective(s) / Homework:  Minimal progress - ACTION (Actively working towards change); Intervened by reinforcing change plan / affirming steps taken    CBT: Pt reports that they have had more structure and routine now and were able to focus to clean their house and enjoy the dog show more. Pt is working to improve remembering to take the 2nd dose of their ADHD medication. She has started to take it at lunch with an alarm set. Pt denies that they are avoiding going to sleep since they are dreading the next day and that this symptoms improved.     CBT/MI: [facility] celebrates with pt that they are working to improve taking their 2nd dose of medication and provides pt with tips to help with remembering to take it. [facility] validates and normalizes that this is difficult for many people to remember since it is in the middle of the day. [facility] celebrates with pt that they are able to focus more and have more structure and have their excitement back instead of dread.      Motivational Interviewing    MI Intervention: Co-Developed Goal: improve focus, Expressed Empathy/Understanding, Supported Autonomy, Collaboration, Evocation, Permission to raise concern or advise, Open-ended questions, Reflections: simple and complex, Change talk (evoked), and Reframe     Change Talk Expressed by the Patient: Committment to change Activation Taking steps    Provider Response to Change Talk: E - Evoked more info from patient about behavior change, A - Affirmed patient's thoughts, decisions, or attempts at behavior change, R - Reflected patient's change talk, and S - Summarized patient's change talk statements    Also provided psychoeducation about behavioral health condition, symptoms, and treatment options      Assessments completed prior to visit:  The following assessments were completed by patient for this visit:  PHQ9:        10/21/2016     9:24 AM 12/20/2022    12:10 PM 1/24/2023    12:02 PM 2/26/2023     2:25 PM 3/15/2023     5:21 PM 11/20/2023     9:12 AM 1/8/2024     9:14 AM   PHQ-9 SCORE   PHQ-9 Total Score MyChart  9 (Mild depression) 14 (Moderate depression) 15 (Moderately severe depression) 7 (Mild depression) 10 (Moderate depression) 1 (Minimal depression)   PHQ-9 Total Score 2 9 14 15 7 10 1     GAD2:       3/15/2023     5:22 PM 4/16/2023     2:12 PM 5/22/2023     1:25 AM 7/24/2023     9:55 AM 10/1/2023     7:58 PM 11/20/2023     9:17 AM 1/8/2024     9:14 AM   CHLOE-2   Feeling nervous, anxious, or on edge 1 1 3 1 1 2 0   Not being able to stop or control worrying 0 0 0 1 1 1 0   CHLOE-2 Total Score 1 1 3 2 2 3    3 0    0     PROMIS 10-Global Health (only subscores and total score):       2/26/2023     3:11 PM 3/15/2023     5:23 PM 6/18/2023    11:26 PM 10/1/2023     8:00 PM 1/8/2024     9:16 AM   PROMIS-10 Scores Only   Global Mental Health Score 9 10 9 9 14    14   Global Physical Health Score 14 15 15 14 16    16   PROMIS TOTAL - SUBSCORES 23 25 24 23 30    30     Care Plan review completed: No    Medication Review:  No changes to current psychiatric medication(s)    Medication Compliance:  Yes    Changes in Health Issues:   None reported    Chemical Use Review:   Substance Use: Chemical use reviewed, no active concerns identified      Tobacco Use: No current tobacco use.      Assessment: Current Emotional / Mental Status (status of significant symptoms):  Risk status (Self / Other harm or suicidal ideation)  Patient denies a history of suicidal ideation, suicide attempts, self-injurious behavior, homicidal ideation, homicidal behavior, and and other safety concerns  Patient denies current fears or concerns for personal safety.  Patient denies current or recent suicidal ideation or behaviors.  Patient denies current or recent homicidal ideation or behaviors.  Patient denies current or recent self injurious behavior or  ideation.  Patient denies other safety concerns.  A safety and risk management plan has not been developed at this time, however patient was encouraged to call [place] [phone] should there be a change in any of these risk factors.    Appearance:   Appropriate    Eye Contact:   Good   Psychomotor Behavior: Normal   Attitude:   Cooperative  Pleasant  Orientation:   All  Speech   Rate / Production: Normal    Volume:  Normal   Mood:    Normal  Affect:    Appropriate   Thought Content:  Clear   Thought Form:  Coherent  Logical   Insight:    Good     Diagnoses:  1. Mild episode of recurrent major depressive disorder (H24)    2. Attention deficit hyperactivity disorder (ADHD), predominantly inattentive type        Collateral Reports Completed:  Communicated with: Home Coughlin M.D.     Plan: (Homework, other):  Patient was given information about behavioral services and encouraged to schedule a follow up appointment with the clinic [facility] in 1 month.  She was also given information about mental health symptoms and treatment options .  CD Recommendations: No indications of CD issues.     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    Anticipated number of session for this episode of care: 5-6  Anticipation frequency of session: Monthly  Anticipated Duration of each session: 16-37 minutes  Treatment plan will be reviewed in 90 days or when goals have been changed.         MeasurableTreatment Goal(s) related to diagnosis / functional impairment(s)  Goal 1: Patient will receive support through medication changes.     I will know I've met my goal when I take the medication consistently and get a feel of my ability to do things.       Objective #A (Patient Action)                          Patient will take medication as prescribed consistently, especially the 2nd dose of the day, report medication effectiveness and side effects associated with psychiatric medications each [facility] meeting.   Status: New - Date: 1/9/2024       Intervention(s)  [facility] will inquire about pt's medication effectiveness to treat symptoms and any side effects they are experiencing each meeting.     Patient has reviewed and agreed to the above plan.     Patient has reviewed and agreed to the above plan.      Radha Roberts, ANTHONY  January 9, 2024

## 2024-01-08 NOTE — PROGRESS NOTES
"Virtual Visit Details    Type of service:  Video Visit     Originating Location (pt. Location): {video visit patient location:131099::\"Home\"}  {PROVIDER LOCATION On-site should be selected for visits conducted from your clinic location or adjoining [facility] hospital, academic office, or other nearby [facility] building. Off-site should be selected for all other provider locations, including home:364[address]}  Distant Location (provider location):  {virtual location provider:392437}  Platform used for Video Visit: {Virtual Visit Platforms:175595::\"[employer]\"}  "

## 2024-01-08 NOTE — NURSING NOTE
Is the patient currently in the state of [place]? YES    Visit mode:VIDEO    If the visit is dropped, the patient can be reconnected by: VIDEO VISIT: Send to e-mail at: [email or web address]    Will anyone else be joining the visit? NO  (If patient encounters technical issues they should call [phone])    How would you like to obtain your AVS? MyChart    Are changes needed to the allergy or medication list? No    Reason for visit: VIKY CHAMPION

## 2024-01-25 DIAGNOSIS — I10 ESSENTIAL HYPERTENSION WITH GOAL BLOOD PRESSURE LESS THAN 130/80: ICD-10-CM

## 2024-01-25 RX ORDER — CARVEDILOL 3.12 MG/1
TABLET ORAL
Qty: 180 TABLET | Refills: 0 | Status: SHIPPED | OUTPATIENT
Start: 2024-01-25 | End: 2024-03-11

## 2024-01-25 RX ORDER — AMLODIPINE BESYLATE 10 MG/1
10 TABLET ORAL DAILY
Qty: 90 TABLET | Refills: 0 | Status: SHIPPED | OUTPATIENT
Start: 2024-01-25 | End: 2024-03-11

## 2024-01-25 RX ORDER — HYDROCHLOROTHIAZIDE 25 MG/1
TABLET ORAL
Qty: 90 TABLET | Refills: 0 | Status: SHIPPED | OUTPATIENT
Start: 2024-01-25 | End: 2024-03-11

## 2024-01-25 NOTE — TELEPHONE ENCOUNTER
1st attempt: Called and left message for patient to return call to clinic.    -if patient return call to clinic please inform of provider message and assists with scheduling an physical with Brooklyn Mas, CMA

## 2024-01-25 NOTE — LETTER
January 26, 2024      Kalani Sherwood  [address] [place] [id number]        Dear Kalani,     Your provider has sent a refill of amLODIPine (NORVASC) 10 MG tablet,carvedilol (COREG) 3.125 MG tablet and hydrochlorothiazide (HYDRODIURIL) 25 MG tablet  . You are due for an physical appointment for further refills.  We ask that you schedule a visit with your provider.   Please contact the clinic or use [employer] to schedule an physical appointment for further refills.     Sincerely,     SRINIVASA Cullen/CAT

## 2024-02-20 DIAGNOSIS — R32 URINARY INCONTINENCE, UNSPECIFIED TYPE: ICD-10-CM

## 2024-02-20 RX ORDER — OXYBUTYNIN CHLORIDE 15 MG/1
15 TABLET, EXTENDED RELEASE ORAL DAILY
Qty: 90 TABLET | Refills: 0 | Status: SHIPPED | OUTPATIENT
Start: 2024-02-20 | End: 2024-03-11

## 2024-03-11 ENCOUNTER — OFFICE VISIT (OUTPATIENT)
Dept: FAMILY MEDICINE | Facility: CLINIC | Age: 59
End: 2024-03-11
Payer: COMMERCIAL

## 2024-03-11 ENCOUNTER — VIRTUAL VISIT (OUTPATIENT)
Dept: PSYCHIATRY | Facility: CLINIC | Age: 59
End: 2024-03-11
Payer: COMMERCIAL

## 2024-03-11 VITALS
RESPIRATION RATE: 26 BRPM | OXYGEN SATURATION: 98 % | BODY MASS INDEX: 29.71 KG/M2 | SYSTOLIC BLOOD PRESSURE: 133 MMHG | HEIGHT: 64 IN | WEIGHT: 174 LBS | TEMPERATURE: 97.9 F | DIASTOLIC BLOOD PRESSURE: 84 MMHG | HEART RATE: 62 BPM

## 2024-03-11 DIAGNOSIS — F90.0 ADHD (ATTENTION DEFICIT HYPERACTIVITY DISORDER), INATTENTIVE TYPE: Primary | ICD-10-CM

## 2024-03-11 DIAGNOSIS — Z13.220 SCREENING FOR HYPERLIPIDEMIA: ICD-10-CM

## 2024-03-11 DIAGNOSIS — E87.6 HYPOKALEMIA: ICD-10-CM

## 2024-03-11 DIAGNOSIS — F90.0 ADHD (ATTENTION DEFICIT HYPERACTIVITY DISORDER), INATTENTIVE TYPE: ICD-10-CM

## 2024-03-11 DIAGNOSIS — I10 ESSENTIAL HYPERTENSION WITH GOAL BLOOD PRESSURE LESS THAN 130/80: ICD-10-CM

## 2024-03-11 DIAGNOSIS — R32 URINARY INCONTINENCE, UNSPECIFIED TYPE: ICD-10-CM

## 2024-03-11 DIAGNOSIS — Z13.1 SCREENING FOR DIABETES MELLITUS: ICD-10-CM

## 2024-03-11 DIAGNOSIS — M65.30 TRIGGER FINGER, ACQUIRED: ICD-10-CM

## 2024-03-11 DIAGNOSIS — F33.0 MAJOR DEPRESSIVE DISORDER, RECURRENT EPISODE, MILD (H): ICD-10-CM

## 2024-03-11 DIAGNOSIS — Z00.00 ROUTINE GENERAL MEDICAL EXAMINATION AT A HEALTH CARE FACILITY: Primary | ICD-10-CM

## 2024-03-11 DIAGNOSIS — Z12.4 CERVICAL CANCER SCREENING: ICD-10-CM

## 2024-03-11 LAB — HBA1C MFR BLD: 5.7 % (ref 0–5.6)

## 2024-03-11 PROCEDURE — 99214 OFFICE O/P EST MOD 30 MIN: CPT | Mod: 95 | Performed by: PSYCHIATRY & NEUROLOGY

## 2024-03-11 PROCEDURE — 36415 COLL VENOUS BLD VENIPUNCTURE: CPT | Performed by: PHYSICIAN ASSISTANT

## 2024-03-11 PROCEDURE — 83036 HEMOGLOBIN GLYCOSYLATED A1C: CPT | Performed by: PHYSICIAN ASSISTANT

## 2024-03-11 PROCEDURE — G0145 SCR C/V CYTO,THINLAYER,RESCR: HCPCS | Performed by: PHYSICIAN ASSISTANT

## 2024-03-11 PROCEDURE — 80048 BASIC METABOLIC PNL TOTAL CA: CPT | Performed by: PHYSICIAN ASSISTANT

## 2024-03-11 PROCEDURE — 87624 HPV HI-RISK TYP POOLED RSLT: CPT | Performed by: PHYSICIAN ASSISTANT

## 2024-03-11 PROCEDURE — 99396 PREV VISIT EST AGE 40-64: CPT | Performed by: PHYSICIAN ASSISTANT

## 2024-03-11 PROCEDURE — 80061 LIPID PANEL: CPT | Performed by: PHYSICIAN ASSISTANT

## 2024-03-11 RX ORDER — CARVEDILOL 3.12 MG/1
3.12 TABLET ORAL 2 TIMES DAILY
Qty: 180 TABLET | Refills: 3 | Status: SHIPPED | OUTPATIENT
Start: 2024-03-11

## 2024-03-11 RX ORDER — ESCITALOPRAM OXALATE 20 MG/1
20 TABLET ORAL DAILY
Qty: 90 TABLET | Refills: 1 | Status: SHIPPED | OUTPATIENT
Start: 2024-03-11

## 2024-03-11 RX ORDER — DEXTROAMPHETAMINE SACCHARATE, AMPHETAMINE ASPARTATE, DEXTROAMPHETAMINE SULFATE AND AMPHETAMINE SULFATE 2.5; 2.5; 2.5; 2.5 MG/1; MG/1; MG/1; MG/1
TABLET ORAL
Qty: 90 TABLET | Refills: 0 | Status: SHIPPED | OUTPATIENT
Start: 2024-04-11 | End: 2024-05-20

## 2024-03-11 RX ORDER — DEXTROAMPHETAMINE SACCHARATE, AMPHETAMINE ASPARTATE, DEXTROAMPHETAMINE SULFATE AND AMPHETAMINE SULFATE 2.5; 2.5; 2.5; 2.5 MG/1; MG/1; MG/1; MG/1
TABLET ORAL
Qty: 90 TABLET | Refills: 0 | Status: SHIPPED | OUTPATIENT
Start: 2024-03-11 | End: 2024-05-20

## 2024-03-11 RX ORDER — HYDROCHLOROTHIAZIDE 25 MG/1
25 TABLET ORAL DAILY
Qty: 90 TABLET | Refills: 3 | Status: SHIPPED | OUTPATIENT
Start: 2024-03-11

## 2024-03-11 RX ORDER — AMLODIPINE BESYLATE 10 MG/1
10 TABLET ORAL DAILY
Qty: 90 TABLET | Refills: 3 | Status: SHIPPED | OUTPATIENT
Start: 2024-03-11

## 2024-03-11 RX ORDER — OXYBUTYNIN CHLORIDE 15 MG/1
15 TABLET, EXTENDED RELEASE ORAL DAILY
Qty: 90 TABLET | Refills: 3 | Status: SHIPPED | OUTPATIENT
Start: 2024-03-11

## 2024-03-11 RX ORDER — DEXTROAMPHETAMINE SACCHARATE, AMPHETAMINE ASPARTATE, DEXTROAMPHETAMINE SULFATE AND AMPHETAMINE SULFATE 2.5; 2.5; 2.5; 2.5 MG/1; MG/1; MG/1; MG/1
10 TABLET ORAL 2 TIMES DAILY
Qty: 60 TABLET | Refills: 0 | Status: CANCELLED | OUTPATIENT
Start: 2024-03-11

## 2024-03-11 RX ORDER — DEXTROAMPHETAMINE SACCHARATE, AMPHETAMINE ASPARTATE, DEXTROAMPHETAMINE SULFATE AND AMPHETAMINE SULFATE 2.5; 2.5; 2.5; 2.5 MG/1; MG/1; MG/1; MG/1
TABLET ORAL
Qty: 90 TABLET | Refills: 0 | Status: SHIPPED | OUTPATIENT
Start: 2024-05-12 | End: 2024-05-20

## 2024-03-11 SDOH — HEALTH STABILITY: PHYSICAL HEALTH: ON AVERAGE, HOW MANY DAYS PER WEEK DO YOU ENGAGE IN MODERATE TO STRENUOUS EXERCISE (LIKE A BRISK WALK)?: 0 DAYS

## 2024-03-11 SDOH — HEALTH STABILITY: PHYSICAL HEALTH: ON AVERAGE, HOW MANY MINUTES DO YOU ENGAGE IN EXERCISE AT THIS LEVEL?: 0 MIN

## 2024-03-11 ASSESSMENT — SOCIAL DETERMINANTS OF HEALTH (SDOH): HOW OFTEN DO YOU GET TOGETHER WITH FRIENDS OR RELATIVES?: ONCE A WEEK

## 2024-03-11 NOTE — COMMUNITY RESOURCES LIST (ENGLISH)
03/11/2024    [employer]  N/A  For questions about this resource list or additional care needs, please contact your primary care clinic or care manager.  Phone: [phone]   Email: N/A   Address: [address] [place] [id number]   Hours: N/A        Exercise and Recreation       Gym or workout facility  1  [place] [employer] [facility] Recreation [place] Distance: 2.04 miles      In-Person   [address] [place] [address]  Language: English  Hours: Mon - Fri 3:00 PM - 9:00 PM  Fees: Free, Self Pay   Phone: (833) 118-2816 Email: [email or web address] Website: [email or web address]     2  [employer] [facility] Distance: 2.17 miles      In-Person   [address] [place] [address]  Language: English  Hours: Mon - Sun Open 24 Hours  Fees: Insurance, Self Pay, Sliding Fee   Phone: (500) 693-2758 Email: [email or web address]@[employer] Website: [email or web address]/[email or web address]-[email or web address]/          Important Numbers & Websites       Emergency Services   911  [facility]   311  Poison Control   (852) 603-3908  Suicide Prevention Lifeline   (696) 985-4610 (TALK)  Child Abuse Hotline   (100) 581-1375 (4-A-Child)  Sexual Assault Hotline   (357) 160-4371 (HOPE)  National Runaway Safeline   (297) 635-6297 (RUNAWAY)  All-Options Talkline   (944) 396-1476  Substance Abuse Referral   (334) 570-6476 (HELP)

## 2024-03-11 NOTE — NURSING NOTE
Faxed DIDIER to [facility] at [phone]. Put in [employer] binder.    Melissa ROJAS MA

## 2024-03-11 NOTE — PATIENT INSTRUCTIONS
Preventive Care Advice   This is general advice given by our system to help you stay healthy. However, your care team may have specific advice just for you. Please talk to your care team about your preventive care needs.  Nutrition  Eat 5 or more servings of fruits and vegetables each day.  Try wheat bread, brown rice and whole grain pasta (instead of white bread, rice, and pasta).  Get enough calcium and vitamin D. Check the label on foods and aim for 100% of the RDA (recommended daily allowance).  Lifestyle  Exercise at least 150 minutes each week   (30 minutes a day, 5 days a week).  Do muscle strengthening activities 2 days a week. These help control your weight and prevent disease.  No smoking.  Wear sunscreen to prevent skin cancer.  Have a dental exam and cleaning every 6 months.  Yearly exams  See your health care team every year to talk about:  Any changes in your health.  Any medicines your care team has prescribed.  Preventive care, family planning, and ways to prevent chronic diseases.  Shots (vaccines)   HPV shots (up to age 26), if you've never had them before.  Hepatitis B shots (up to age 59), if you've never had them before.  COVID-19 shot: Get this shot when it's due.  Flu shot: Get a flu shot every year.  Tetanus shot: Get a tetanus shot every 10 years.  Pneumococcal, hepatitis A, and RSV shots: Ask your care team if you need these based on your risk.  Shingles shot (for age 50 and up).  General health tests  Diabetes screening:  Starting at age 35, Get screened for diabetes at least every 3 years.  If you are younger than age 35, ask your care team if you should be screened for diabetes.  Cholesterol test: At age 39, start having a cholesterol test every 5 years, or more often if advised.  Bone density scan (DEXA): At age 50, ask your care team if you should have this scan for osteoporosis (brittle bones).  Hepatitis C: Get tested at least once in your life.  STIs (sexually transmitted  infections)  Before age 24: Ask your care team if you should be screened for STIs.  After age 24: Get screened for STIs if you're at risk. You are at risk for STIs (including HIV) if:  You are sexually active with more than one person.  You don't use condoms every time.  You or a partner was diagnosed with a sexually transmitted infection.  If you are at risk for HIV, ask about PrEP medicine to prevent HIV.  Get tested for HIV at least once in your life, whether you are at risk for HIV or not.  Cancer screening tests  Cervical cancer screening: If you have a cervix, begin getting regular cervical cancer screening tests at age 21. Most people who have regular screenings with normal results can stop after age 65. Talk about this with your provider.  Breast cancer scan (mammogram): If you've ever had breasts, begin having regular mammograms starting at age 40. This is a scan to check for breast cancer.  Colon cancer screening: It is important to start screening for colon cancer at age 45.  Have a colonoscopy test every 10 years (or more often if you're at risk) Or, ask your provider about stool tests like a FIT test every year or Cologuard test every 3 years.  To learn more about your testing options, visit: https://www.[employer]/208130.pdf.  For help making a decision, visit: https://bit.ly/pi54445.  Prostate cancer screening test: If you have a prostate and are age 55 to 69, ask your provider if you would benefit from a yearly prostate cancer screening test.  Lung cancer screening: If you are a current or former smoker age 50 to 80, ask your care team if ongoing lung cancer screenings are right for you.  For informational purposes only. Not to replace the advice of your health care provider. Copyright   2023 [employer]. All rights reserved. Clinically reviewed by the [employer] [facility]. Sellf 073054 - REV 01/24.    Learning About Stress  What is stress?     Stress is your  body's response to a hard situation. Your body can have a physical, emotional, or mental response. Stress is a fact of life for most people, and it affects everyone differently. What causes stress for you may not be stressful for someone else.  A lot of things can cause stress. You may feel stress when you go on a job interview, take a test, or run a race. This kind of short-term stress is normal and even useful. It can help you if you need to work hard or react quickly. For example, stress can help you finish an important job on time.  Long-term stress is caused by ongoing stressful situations or events. Examples of long-term stress include long-term health problems, ongoing problems at work, or conflicts in your family. Long-term stress can harm your health.  How does stress affect your health?  When you are stressed, your body responds as though you are in danger. It makes hormones that speed up your heart, make you breathe faster, and give you a burst of energy. This is called the fight-or-flight stress response. If the stress is over quickly, your body goes back to normal and no harm is done.  But if stress happens too often or lasts too long, it can have bad effects. Long-term stress can make you more likely to get sick, and it can make symptoms of some diseases worse. If you tense up when you are stressed, you may develop neck, shoulder, or low back pain. Stress is linked to high blood pressure and heart disease.  Stress also harms your emotional health. It can make you thomas, tense, or depressed. Your relationships may suffer, and you may not do well at work or school.  What can you do to manage stress?  You can try these things to help manage stress:   Do something active. Exercise or activity can help reduce stress. Walking is a great way to get started. Even everyday activities such as housecleaning or yard work can help.  Try yoga or aster chi. These techniques combine exercise and meditation. You may need  some training at first to learn them.  Do something you enjoy. For example, listen to music or go to a movie. Practice your hobby or do volunteer work.  Meditate. This can help you relax, because you are not worrying about what happened before or what may happen in the future.  Do guided imagery. Imagine yourself in any setting that helps you feel calm. You can use online videos, books, or a teacher to guide you.  Do breathing exercises. For example:  From a standing position, bend forward from the waist with your knees slightly bent. Let your arms dangle close to the floor.  Breathe in slowly and deeply as you return to a standing position. Roll up slowly and lift your head last.  Hold your breath for just a few seconds in the standing position.  Breathe out slowly and bend forward from the waist.  Let your feelings out. Talk, laugh, cry, and express anger when you need to. Talking with supportive friends or family, a counselor, or a valeria leader about your feelings is a healthy way to relieve stress. Avoid discussing your feelings with people who make you feel worse.  Write. It may help to write about things that are bothering you. This helps you find out how much stress you feel and what is causing it. When you know this, you can find better ways to cope.  What can you do to prevent stress?  You might try some of these things to help prevent stress:  Manage your time. This helps you find time to do the things you want and need to do.  Get enough sleep. Your body recovers from the stresses of the day while you are sleeping.  Get support. Your family, friends, and community can make a difference in how you experience stress.  Limit your news feed. Avoid or limit time on social media or news that may make you feel stressed.  Do something active. Exercise or activity can help reduce stress. Walking is a great way to get started.  Where can you learn more?  Go to https://www.healthwise.net/patiented  Enter N032 in the  "search box to learn more about \"Learning About Stress.\"  Current as of: February 26, 2023               Content Version: 13.8    4563-2544 [employer].   Care instructions adapted under license by your healthcare professional. If you have questions about a medical condition or this instruction, always ask your healthcare professional. [employer] disclaims any warranty or liability for your use of this information.      Substance Use Disorder: Care Instructions  Overview     You can improve your life and health by stopping your use of alcohol or drugs. When you don't drink or use drugs, you may feel and sleep better. You may get along better with your family, friends, and coworkers. There are medicines and programs that can help with substance use disorder.  How can you care for yourself at home?  Here are some ways to help you stay sober and prevent relapse.  If you have been given medicine to help keep you sober or reduce your cravings, be sure to take it exactly as prescribed.  Talk to your doctor about programs that can help you stop using drugs or drinking alcohol.  Do not keep alcohol or drugs in your home.  Plan ahead. Think about what you'll say if other people ask you to drink or use drugs. Try not to spend time with people who drink or use drugs.  Use the time and money spent on drinking or drugs to do something that's important to you.  Preventing a relapse  Have a plan to deal with relapse. Learn to recognize changes in your thinking that lead you to drink or use drugs. Get help before you start to drink or use drugs again.  Try to stay away from situations, friends, or places that may lead you to drink or use drugs.  If you feel the need to drink alcohol or use drugs again, seek help right away. Call a trusted friend or family member. Some people get support from organizations such as Narcotics Anonymous or idemama or from treatment facilities.  If you relapse, get help " as soon as you can. Some people make a plan with another person that outlines what they want that person to do for them if they relapse. The plan usually includes how to handle the relapse and who to notify in case of relapse.  Don't give up. Remember that a relapse doesn't mean that you have failed. Use the experience to learn the triggers that lead you to drink or use drugs. Then quit again. Recovery is a lifelong process. Many people have several relapses before they are able to quit for good.  Follow-up care is a key part of your treatment and safety. Be sure to make and go to all appointments, and call your doctor if you are having problems. It's also a good idea to know your test results and keep a list of the medicines you take.  When should you call for help?   Call 911  anytime you think you may need emergency care. For example, call if you or someone else:    Has overdosed or has withdrawal signs. Be sure to tell the emergency workers that you are or someone else is using or trying to quit using drugs. Overdose or withdrawal signs may include:  Losing consciousness.  Seizure.  Seeing or hearing things that aren't there (hallucinations).     Is thinking or talking about suicide or harming others.   Where to get help 24 hours a day, 7 days a week   If you or someone you know talks about suicide, self-harm, a mental health crisis, a substance use crisis, or any other kind of emotional distress, get help right away. You can:    Call the Suicide and Crisis Lifeline at 98.     Call 6-627-888-TALK (1-436.979.9698).     Text HOME to 458273 to access the Crisis Text Line.   Consider saving these numbers in your phone.  Go to [employer]org for more information or to chat online.  Call your doctor now or seek immediate medical care if:    You are having withdrawal symptoms. These may include nausea or vomiting, sweating, shakiness, and anxiety.   Watch closely for changes in your health, and be sure to contact  "your doctor if:    You have a relapse.     You need more help or support to stop.   Where can you learn more?  Go to https://www.[employer].net/patiented  Enter H573 in the search box to learn more about \"Substance Use Disorder: Care Instructions.\"  Current as of: March 21, 2023               Content Version: 13.8    0469-5729 [employer].   Care instructions adapted under license by your healthcare professional. If you have questions about a medical condition or this instruction, always ask your healthcare professional. Healthwise, [employer] disclaims any warranty or liability for your use of this information.      "

## 2024-03-11 NOTE — NURSING NOTE
Is the patient currently in the state of [place]? YES    Visit mode:VIDEO    If the visit is dropped, the patient can be reconnected by: VIDEO VISIT: Send to e-mail at: [email or web address]    Will anyone else be joining the visit? NO  (If patient encounters technical issues they should call [phone]    How would you like to obtain your AVS? MyChart    Are changes needed to the allergy or medication list? No    Reason for visit: VIKY CHAMPION

## 2024-03-11 NOTE — PATIENT INSTRUCTIONS
Moving from: Collaborative Care Psychiatry Service ([facility])    Moving to: Referring Provider        We are returning your care back to your Referring Provider.      We will update your Referring Provider/Clinic that you've completed your care with [facility]. This way, they can help you build on the progress you've made in your mental health.        Here's what happens next:    Within the next 3 months: Please set up a visit with your referring provider. Ask for a mental health check-in.  Stay on your current medications--do not change your doses. Your symptoms could get worse if you quickly stop or decrease your medicine.  We've refilled your mental health medications for the next 3 months (90 days). Future refills or dose changes should come from your Referring Provider Clinic.    If you're in therapy, keep it up! If you're not but would like to start, ask your Referring Provider clinic for a referral to therapy, or call [facility] ([phone]) to set up a visit with a therapist.        It's been a pleasure to work with you! If you and your clinic decide that you should return to [facility] in the future, we remain ready to serve you. Ask your clinic for a new referral if needed.

## 2024-03-11 NOTE — PROGRESS NOTES
"Preventive Care Visit  [facility] PAULA Cam PA-C, Family Medicine  Mar 11, 2024    Assessment & Plan     Routine general medical examination at a health care facility    Essential hypertension with goal blood pressure less than 130/80  Stable, refilled meds. BMP ordered.   - amLODIPine (NORVASC) 10 MG tablet; Take 1 tablet (10 mg) by mouth daily  - hydrochlorothiazide (HYDRODIURIL) 25 MG tablet; Take 1 tablet (25 mg) by mouth daily  - carvedilol (COREG) 3.125 MG tablet; Take 1 tablet (3.125 mg) by mouth 2 times daily    ADHD (attention deficit hyperactivity disorder), inattentive type  Stable, will transition back to primary care for refills. See me virtually in June.    Major depressive disorder, recurrent episode, mild (H24)  Stable, refilled.   - escitalopram (LEXAPRO) 20 MG tablet; Take 1 tablet (20 mg) by mouth daily    Cervical cancer screening    - Pap Screen with HPV - recommended age 30 - 65 years    Urinary incontinence, unspecified type  Stable, refilled.   - oxyBUTYnin ER (DITROPAN XL) 15 MG 24 hr tablet; Take 1 tablet (15 mg) by mouth daily    Trigger finger, acquired  Referral   - Orthopedic  Referral; Future    Screening for diabetes mellitus    - Basic metabolic panel; Future  - Hemoglobin A1c; Future    Screening for hyperlipidemia    - Lipid panel reflex to direct LDL Non-fasting; Future              BMI  Estimated body mass index is 29.78 kg/m  as calculated from the following:    Height as of this encounter: 1.628 m (5' 4.09\").    Weight as of this encounter: 78.9 kg (174 lb).       Counseling  Appropriate preventive services were discussed with this patient, including applicable screening as appropriate for fall prevention, nutrition, physical activity, Tobacco-use cessation, weight loss and cognition.  Checklist reviewing preventive services available has been given to the patient.  Reviewed patient's diet, addressing concerns and/or questions. "           Britney Uriarte is a 58 year old, presenting for the following:  Physical and Health Maintenance        3/11/2024     2:42 PM   Additional Questions   Roomed by camden yang        Health Care Directive  Patient does not have a Health Care Directive or Living Will: Discussed advance care planning with patient; however, patient declined at this time.    HPI  Catching finger on the left hand-bothersome, works in dental office.   Psych discharged from care, PCP to refill.             3/11/2024   General Health   How would you rate your overall physical health? Good   Feel stress (tense, anxious, or unable to sleep) Rather much   (!) STRESS CONCERN      3/11/2024   Nutrition   Three or more servings of calcium each day? (!) NO   Diet: Regular (no restrictions)   How many servings of fruit and vegetables per day? (!) 0-1   How many sweetened beverages each day? (!) 2         3/11/2024   Exercise   Days per week of moderate/strenous exercise 0 days   Average minutes spent exercising at this level 0 min   (!) EXERCISE CONCERN      3/11/2024   Social Factors   Frequency of gathering with friends or relatives Once a week   Worry food won't last until get money to buy more No   Food not last or not have enough money for food? No   Do you have housing?  Yes   Are you worried about losing your housing? No   Lack of transportation? No   Unable to get utilities (heat,electricity)? No         3/11/2024   Dental   Dentist two times every year? Yes         3/11/2024   TB Screening   Were you born outside of [place]?  No         Today's PHQ-9 Score:       3/10/2024    11:57 PM   PHQ-9 SCORE   PHQ-9 Total Score MyChart 4 (Minimal depression)   PHQ-9 Total Score 4           3/11/2024   Substance Use   Alcohol more than 3/day or more than 7/wk No   Do you use any other substances recreationally? (!) ALCOHOL    (!) CANNABIS PRODUCTS     Social History     Tobacco Use    Smoking status: Former     Packs/day: 0.50     Years: 10.00      "Additional pack years: 0.00     Total pack years: 5.00     Types: Cigarettes     Quit date: 10/2/2004     Years since quittin.4    Smokeless tobacco: Never   Vaping Use    Vaping Use: Never used   Substance Use Topics    Alcohol use: Yes     Comment: occ.    Drug use: Yes     Types: Marijuana           2023   LAST [facility]-7 RESULTS   Any relative bilateral breast cancer No   Any ONE woman have BOTH breast AND ovarian cancer No        Mammogram Screening - Mammogram every 1-2 years updated in Health Maintenance based on mutual decision making        3/11/2024   STI Screening   New sexual partner(s) since last STI/HIV test? No     History of abnormal Pap smear: NO - age 30-65 PAP every 5 years with negative HPV co-testing recommended        Latest Ref Rng & Units 2014    12:00 AM 2011    12:00 AM   PAP / HPV   PAP (Historical) Negative Negative     NIL        This result is from an external source.     ASCVD Risk   The 10-year ASCVD risk score (Iftikhar VOSS, et al., 2019) is: 3.3%    Values used to calculate the score:      Age: 58 years      Sex: Female      Is Non- : No      Diabetic: No      Tobacco smoker: No      Systolic Blood Pressure: 133 mmHg      Is BP treated: Yes      HDL Cholesterol: 65 mg/dL      Total Cholesterol: 240 mg/dL           Reviewed and updated as needed this visit by Provider                             Objective    Exam  /84 (BP Location: Left arm, Patient Position: Chair, Cuff Size: Adult Regular)   Pulse 62   Temp 97.9  F (36.6  C) (Oral)   Resp 26   Ht 1.628 m (5' 4.09\")   Wt 78.9 kg (174 lb)   LMP 2016   SpO2 98%   BMI 29.78 kg/m     Estimated body mass index is 29.78 kg/m  as calculated from the following:    Height as of this encounter: 1.628 m (5' 4.09\").    Weight as of this encounter: 78.9 kg (174 lb).    Physical Exam  GENERAL: alert and no distress  EYES: Eyes grossly normal to inspection, PERRL and conjunctivae and " sclerae normal  HENT: ear canals and TM's normal, nose and mouth without ulcers or lesions  NECK: no adenopathy, no asymmetry, masses, or scars  RESP: lungs clear to auscultation - no rales, rhonchi or wheezes  CV: regular rate and rhythm, normal S1 S2, no S3 or S4, no murmur, click or rub,   ABDOMEN: soft, nontender, no hepatosplenomegaly, no masses    (female) normal female external genitalia, normal urethral meatus, normal vaginal mucosa, and normal cervix  MS: no gross musculoskeletal defects noted, no edema  SKIN: no suspicious lesions or rashes  NEURO: Normal strength and tone, mentation intact and speech normal  PSYCH: mentation appears normal, affect normal/bright      Signed Electronically by: Brooklyn Cam PA-C

## 2024-03-11 NOTE — PROGRESS NOTES
Virtual Visit Details    Type of service:  Video Visit     Originating Location (pt. Location): Home    Distant Location (provider location):  On-site  Platform used for Video Visit: [facility] Psychiatry Consult Note    IDENTIFICATION   Name: Kalani Sherwood   : 1965/58 year old      Sex:    @ female          Telemedicine Visit: The patient's condition can be safely assessed and treated via synchronous audio and visual telemedicine encounter.        Face to Face/patient Contact total time: 14 minutes  Pre Charting time: 1 minutes; Post charting time, communication and other activities: 4 minutes; Total time 19 minutes  10:04 AM -10:18 AM          SUBJECTIVE   Doing well with more time. Adderall working well. Makes everything easier, happier. Some struggles but easier to work through them. Things are less challenging. Takes adderall at 6am and 1pm. Evenings are a struggle. It is working through 6-1. Wears off around 5 or 6ish. Works very well for work, but then struggles at home.       The following assessments were completed by patient for this visit:  PROMIS 10-Global Health (only subscores and total score):       2023     3:11 PM 3/15/2023     5:23 PM 2023    11:26 PM 10/1/2023     8:00 PM 2024     9:16 AM   PROMIS-10 Scores Only   Global Mental Health Score 9 10 9 9 14    14   Global Physical Health Score 14 15 15 14 16    16   PROMIS TOTAL - SUBSCORES 23 25 24 23 30    30             2023     9:12 AM 2024     9:14 AM 3/10/2024    11:57 PM   PHQ   PHQ-9 Total Score 10 1 4   Q9: Thoughts of better off dead/self-harm past 2 weeks Not at all Not at all Not at all          2023     2:28 PM 2023     1:25 AM 2023     9:17 AM   CHLOE-7 SCORE   Total Score 6 (mild anxiety) 16 (severe anxiety) 2 (minimal anxiety)   Total Score 6 16 2    2        OBJECTIVE     Vital Signs:   [facility] 2016     Labs:  na    Current Medications:  Current Outpatient Medications    Medication    amLODIPine (NORVASC) 10 MG tablet    amphetamine-dextroamphetamine (ADDERALL) 10 MG tablet    amphetamine-dextroamphetamine (ADDERALL) 10 MG tablet    carvedilol (COREG) 3.125 MG tablet    escitalopram (LEXAPRO) 20 MG tablet    guanFACINE (INTUNIV) 2 MG TB24 24 hr tablet    hydrochlorothiazide (HYDRODIURIL) 25 MG tablet    oxyBUTYnin ER (DITROPAN XL) 15 MG 24 hr tablet     No current facility-administered medications for this visit.        The [place] Prescription Monitoring Program has been reviewed and there are no concerns about diversionary activity for controlled substances at this time.        ADDED HISTORY   Starts work at 630am and ends at 6. Works Tuesday through Thursday.     MENTAL STATUS EXAMINATION:   Appearance: Intact attention to grooming and hygiene, casual garb; clutter with extra items scattered around different areas  Attitude: Cooperative  Eye Contact: good  Gait and Station: Sitting  Psychomotor Behavior: Within normal limits  Oriented to: Grossly person place and time  Attention Span and Concentration: Grossly intact  Speech: Normal tone  Language: English  Mood: Good  Affect: Euthymic  Associations:  no loose associations  Thought Process:  logical, linear and goal oriented  Thought Content: No evidence of delusions or suicidal or homicidal ideation plan or intent  Memory: Grossly fair  Fund of Knowledge: Intact  Insight:  good  Judgment:  intact, adequate for safety  Impulse Control:  intact        DIAGNOSES:   ADHD, inattentive type  Major depressive disorder, moderate, recurrent, in partial remission  Unspecified anxiety disorder        ASSESSMENT:   Patient with clear history and early childhood onset of ADHD.  Does have symptomatology of major depression as well as anxiety difficulty, both of which are associated with ADHD and impacted by it.  Pursue treatment.  Given hypertension issues, utilize Intuniv.  Venlafaxine may be a contributing factor, we will plan to cross  taper to SSRI.  Utilize stimulant medication, Intuniv trial has failed    Today Kalani Sherwood reports no suicidal ideations. In addition, she has notable risk factors for self-harm, including anxiety. However, risk is mitigated by commitment to family, Jew beliefs and absence of past attempts. Therefore, based on all available evidence including the factors cited above, she does not appear to be at imminent risk for self-harm, does not meet criteria for a 72-hr hold, and therefore remains appropriate for ongoing outpatient level of care.       PLAN:     Consult completed  Does not meet criteria for involuntary treatment or hospitalization  Trial Intuniv 3/3/2023 1 mg nightly => 3/24/23 2 mg po at bedtime efficacy without side effects, without full remission => 6/19/23 3 mg po qam further remission, effective, without side effects, worse sx with high stress => 10/9/23 4 mg po qhs => 11/20/2023 2 mg nightly efficacy for sleep and incontinence-Risks, benefits and alternatives discussed.  Patient provides verbal consent to treatment.  Add Adderall 11/20/23 10 mg p.o. twice daily efficacy, each dose lasting 6 hours perhaps, tolerated well => 10 mg po bid with 5-10 mg po qpm prn ADHD-Risks, benefits and alternatives discussed.  Patient provides verbal consent to treatment.  Advised of cardiac effects, addiction.  Follow-up blood pressure.  Agreed to discontinue cannabis use if PCP not comfortable with controlled substance and cannabis.  Restart escitalopram 4/17/23 5 mg po qday => 5/22/23 10 mg po qday bad stress/depression/anxiety => 10/2/23 20 mg po qday-Risks, benefits and alternatives discussed.  Patient provides verbal consent to treatment.  Consider escitalopram; propranolol   DC'd venlafaxine (possible contribution to HTN)  Labs -reviewed, no further labs indicated at this time  Return to PCP today    Administrative Billing:   Time spent with patient was greater than 50% of time and/or significant time was  spent in counseling and coordination of care regarding above diagnoses and treatment plan. Pre charting time and post charting time/documentation/coordination are done on date of service.      Signed:   Home Coughlin M.D.  [facility] Psychiatry Service    Disclaimer: This note consists of symbols derived from keyboarding, dictation and/or voice recognition software. As a result, there may be errors in the script that have gone undetected. Please consider this when interpreting information found in this chart.    eoc  RETURN TO REFERRING PROVIDER FINAL TREATMENT PLAN  The Psychiatric provider and/or Behavioral health clinician ([facility]) have completed consultation with the patient and are returning to referring provider care for continued care. For worsening symptoms/condition recommendations include:    Medication Recommendations   -For tolerance with Adderall can increase Intuniv to 4 mg nightly  -Main intervention for her tolerance with Adderall would be to increase Adderall by 5 mg increments, highest dose and would day 60 mg, so she could do 15 mg p.o. twice daily with 7.5-50 mg in the evening as needed  -For anxiety and depression may start buspirone typically 5 mg twice daily x 5 days and 10 mg twice daily titrated by 10-20 mg increments up to 30 mg p.o. twice daily  -Alternatively can switch Lexapro to fluoxetine (20-80 mg range) which she has taken previously, or Luvox ( mg dosage range) or Pristiq ( mg range) or Viibryd (started at 10 mg and titrated by 10 mg increments up to 40 mg daily)      Behavioral Recommendations  -Exercise, read books on ADHD        Consider pharmacologic and nonpharmacologic recommendations, if the patient has followed through on recommendations/referrals and any other helpful pertinent information (e.g., recent stressors, med adherence, enough time on the medication or high enough dose etc.)      If post consult recommendations fail, use any of the following options    Reach out to the [facility] provider through Epic staff message for guidance   Order an Adult Behavioral Health eConsult ([id number]) or Pediatric eConsult ([id number])   Refer for another [facility] consultation (after 6 months) or refer to Psychiatry/Long-term management (Mental Health Referral 9077, [facility]/Med management and Long-term management)

## 2024-03-12 LAB
ANION GAP SERPL CALCULATED.3IONS-SCNC: 11 MMOL/L (ref 7–15)
BUN SERPL-MCNC: 15.2 MG/DL (ref 6–20)
CALCIUM SERPL-MCNC: 9.2 MG/DL (ref 8.6–10)
CHLORIDE SERPL-SCNC: 102 MMOL/L (ref 98–107)
CHOLEST SERPL-MCNC: 234 MG/DL
CREAT SERPL-MCNC: 0.72 MG/DL (ref 0.51–0.95)
DEPRECATED HCO3 PLAS-SCNC: 29 MMOL/L (ref 22–29)
EGFRCR SERPLBLD CKD-EPI 2021: >90 ML/MIN/1.73M2
FASTING STATUS PATIENT QL REPORTED: NO
GLUCOSE SERPL-MCNC: 97 MG/DL (ref 70–99)
HDLC SERPL-MCNC: 62 MG/DL
LDLC SERPL CALC-MCNC: 145 MG/DL
NONHDLC SERPL-MCNC: 172 MG/DL
POTASSIUM SERPL-SCNC: 3.2 MMOL/L (ref 3.4–5.3)
SODIUM SERPL-SCNC: 142 MMOL/L (ref 135–145)
TRIGL SERPL-MCNC: 135 MG/DL

## 2024-03-14 LAB
BKR LAB AP GYN ADEQUACY: NORMAL
BKR LAB AP GYN INTERPRETATION: NORMAL
BKR LAB AP HPV REFLEX: NORMAL
BKR LAB AP PREVIOUS ABNORMAL: NORMAL
PATH REPORT.COMMENTS IMP SPEC: NORMAL
PATH REPORT.COMMENTS IMP SPEC: NORMAL
PATH REPORT.RELEVANT HX SPEC: NORMAL

## 2024-03-18 LAB
HUMAN PAPILLOMA VIRUS 16 DNA: NEGATIVE
HUMAN PAPILLOMA VIRUS 18 DNA: NEGATIVE
HUMAN PAPILLOMA VIRUS FINAL DIAGNOSIS: NORMAL
HUMAN PAPILLOMA VIRUS OTHER HR: NEGATIVE

## 2024-03-26 NOTE — TELEPHONE ENCOUNTER
RN received a Right Fax 90 day refill request from [facility] Alyse SHERMAN for guanFACINE (INTUNIV) 2 MG TB24 24 hr tablet .  This refill request was faxed DENIED back to [facility] for the following reasons:    1.This patient already has a 90 day prescription for guanFACINE (INTUNIV) 2 MG TB24 24 hr tablet initiated 1/8/24 with 1 additional refill.      Bebo Robin RN on 3/26/2024 at 11:46 AM     _________________________________________________________________________________________________________    Please refer to the original prescription that shows a 90 day prescription with 1 additional refill.        Outpatient Medication Detail     Disp Refills Start End MATTI  guanFACINE (INTUNIV) 2 MG TB24 24 hr tablet 90 tablet 1 refill 1/8/2024 -- No  Sig - Route: Take 1 tablet (2 mg) by mouth at bedtime - Oral  Sent to pharmacy as: guanFACINE HCl ER 2 MG Oral Tablet Extended Release 24 Hour (INTUNIV)  Class: E-Prescribe  Order: [id number]  E-Prescribing Status: Receipt confirmed by pharmacy (1/8/2024 10:28 AM CST)    Bebo Robin RN on 3/26/2024 at 11:47 AM

## 2024-04-09 ENCOUNTER — MYC REFILL (OUTPATIENT)
Dept: PSYCHIATRY | Facility: CLINIC | Age: 59
End: 2024-04-09
Payer: COMMERCIAL

## 2024-04-09 NOTE — TELEPHONE ENCOUNTER
CRISTHIAN received faxed refill request for guanFACINE (INTUNIV) 2 MG TB24 24 hr tablet       Too soon for refill can refill in June   Rosio Jacobson CMA April 9, 2024

## 2024-04-15 ENCOUNTER — OFFICE VISIT (OUTPATIENT)
Dept: ORTHOPEDICS | Facility: CLINIC | Age: 59
End: 2024-04-15
Attending: PHYSICIAN ASSISTANT
Payer: COMMERCIAL

## 2024-04-15 ENCOUNTER — ANCILLARY PROCEDURE (OUTPATIENT)
Dept: GENERAL RADIOLOGY | Facility: CLINIC | Age: 59
End: 2024-04-15
Attending: PREVENTIVE MEDICINE
Payer: COMMERCIAL

## 2024-04-15 DIAGNOSIS — M65.30 TRIGGER FINGER, ACQUIRED: ICD-10-CM

## 2024-04-15 DIAGNOSIS — M79.641 BILATERAL HAND PAIN: ICD-10-CM

## 2024-04-15 DIAGNOSIS — M79.641 BILATERAL HAND PAIN: Primary | ICD-10-CM

## 2024-04-15 DIAGNOSIS — M79.642 BILATERAL HAND PAIN: Primary | ICD-10-CM

## 2024-04-15 DIAGNOSIS — M79.642 BILATERAL HAND PAIN: ICD-10-CM

## 2024-04-15 PROCEDURE — 99203 OFFICE O/P NEW LOW 30 MIN: CPT | Performed by: PREVENTIVE MEDICINE

## 2024-04-15 PROCEDURE — 73130 X-RAY EXAM OF HAND: CPT | Mod: RT | Performed by: RADIOLOGY

## 2024-04-15 PROCEDURE — 73140 X-RAY EXAM OF FINGER(S): CPT | Mod: LT | Performed by: RADIOLOGY

## 2024-04-15 NOTE — PROGRESS NOTES
HISTORY OF PRESENT ILLNESS  Ms. Sherwood is a pleasant 58 year old year old female who presents to clinic today with the following:  What problem are you here for? 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 2 months     Have you had any recent imaging of this problem? Xrays/MRI/CT scans? No    Have you had treatments for this problem in the past?  -Medications? Tried OTC pain meds  -Physical therapy? no  -Injections? No  -Surgery? No    How severe is this problem today? 0-10 scale? 2    How severe has this problem been at WORST in the past? 0-10 scale? 3-4    What do you think caused this problem? Works as dental hygienist     Does this problem or its symptoms cause difficulty for you falling asleep or staying asleep? No    Anything else you want us to know about this problem? Never had this before, has been a dental hygienist for ~30 yrs and has chronic pain in both hands but only 1 finger triggers at this time.            MEDICAL HISTORY  Patient Active Problem List   Diagnosis    Obesity    CARDIOVASCULAR SCREENING; LDL GOAL LESS THAN 160    Vitamin D deficiency    Anxiety attack    Recurrent major depressive disorder, in partial remission (H24)    Dry eye of left side    Posterior vitreous detachment of right eye    ADHD (attention deficit hyperactivity disorder), inattentive type       Current Outpatient Medications   Medication Sig Dispense Refill    amLODIPine (NORVASC) 10 MG tablet Take 1 tablet (10 mg) by mouth daily 90 tablet 3    amphetamine-dextroamphetamine (ADDERALL) 10 MG tablet Take one tablet twice a day. Take 1/2 to 1 tablet every evening as needed 90 tablet 0    amphetamine-dextroamphetamine (ADDERALL) 10 MG tablet Take one tablet twice a day. Take 1/2 to 1 tablet every evening as needed 90 tablet 0     [START ON 5/12/2024] amphetamine-dextroamphetamine (ADDERALL) 10 MG tablet Take one tablet twice a day. Take 1/2 to 1 tablet every evening as needed 90 tablet 0    carvedilol (COREG) 3.125 MG tablet Take 1 tablet (3.125 mg) by mouth 2 times daily 180 tablet 3    escitalopram (LEXAPRO) 20 MG tablet Take 1 tablet (20 mg) by mouth daily 90 tablet 1    guanFACINE (INTUNIV) 2 MG TB24 24 hr tablet Take 1 tablet (2 mg) by mouth at bedtime 90 tablet 1    hydrochlorothiazide (HYDRODIURIL) 25 MG tablet Take 1 tablet (25 mg) by mouth daily 90 tablet 3    oxyBUTYnin ER (DITROPAN XL) 15 MG 24 hr tablet Take 1 tablet (15 mg) by mouth daily 90 tablet 3       Allergies   Allergen Reactions    Sulfa Antibiotics      Unknown reaction as child likely an antibiotics.  Has tolerated HCTZ which is a nonarylamine sulfa drug    Triamterene      POssible Edema from triamterene        Wasp Venom Hives, Itching and Swelling       Family History   Problem Relation Age of Onset    Hypertension Mother     Depression Mother     Obesity Mother     Anxiety Disorder Mother     Diabetes Father         Adult onset    Hypertension Father     Allergies Father     Cardiovascular Father     Circulatory Father     Depression Father     Gastrointestinal Disease Father     Heart Disease Father     Lipids Father     Coronary Artery Disease Father     Hyperlipidemia Father     Anxiety Disorder Father     Diabetes Maternal Grandmother     Hypertension Maternal Grandmother     Arthritis Maternal Grandmother     Depression Maternal Grandmother     Obesity Maternal Grandmother     Osteoporosis Maternal Grandmother     Anxiety Disorder Maternal Grandmother     Hypertension Maternal Grandfather     Arthritis Maternal Grandfather     Cancer Maternal Grandfather     Cardiovascular Maternal Grandfather     Circulatory Maternal Grandfather     Obesity Maternal Grandfather     Other Cancer Maternal Grandfather     Hypertension Paternal Grandmother     Arthritis Paternal  Grandmother     Heart Disease Paternal Grandmother     Hypertension Paternal Grandfather     Arthritis Paternal Grandfather     Heart Disease Paternal Grandfather     Cerebrovascular Disease Paternal Grandfather     Depression Sister     Gynecology Sister     Obesity Sister     Anxiety Disorder Sister      Social History     Socioeconomic History    Marital status:    Tobacco Use    Smoking status: Former     Current packs/day: 0.00     Average packs/day: 0.5 packs/day for 10.0 years (5.0 ttl pk-yrs)     Types: Cigarettes     Start date: 10/2/1994     Quit date: 10/2/2004     Years since quittin.5    Smokeless tobacco: Never   Vaping Use    Vaping status: Never Used   Substance and Sexual Activity    Alcohol use: Yes     Comment: occ.    Drug use: Yes     Types: Marijuana    Sexual activity: Yes     Partners: Male     Birth control/protection: Post-menopausal, Male Surgical   Other Topics Concern    Parent/sibling w/ CABG, MI or angioplasty before 65F 55M? No     Social Determinants of Health     Financial Resource Strain: Low Risk  (3/11/2024)    Financial Resource Strain     Within the past 12 months, have you or your family members you live with been unable to get utilities (heat, electricity) when it was really needed?: No   Food Insecurity: Low Risk  (3/11/2024)    Food Insecurity     Within the past 12 months, did you worry that your food would run out before you got money to buy more?: No     Within the past 12 months, did the food you bought just not last and you didn t have money to get more?: No   Transportation Needs: Low Risk  (3/11/2024)    Transportation Needs     Within the past 12 months, has lack of transportation kept you from medical appointments, getting your medicines, non-medical meetings or appointments, work, or from getting things that you need?: No   Physical Activity: Inactive (3/11/2024)    Exercise Vital Sign     Days of Exercise per Week: 0 days     Minutes of Exercise per  Session: 0 min   Stress: Stress Concern Present (3/11/2024)    [employer] - Occupational Stress Questionnaire     Feeling of Stress : Rather much   Social Connections: Unknown (3/11/2024)    Social Connection and Isolation Panel [NHANES]     Frequency of Social Gatherings with Friends and Family: Once a week   Interpersonal Safety: Low Risk  (3/11/2024)    Interpersonal Safety     Do you feel physically and emotionally safe where you currently live?: Yes     Within the past 12 months, have you been hit, slapped, kicked or otherwise physically hurt by someone?: No     Within the past 12 months, have you been humiliated or emotionally abused in other ways by your partner or ex-partner?: No   Housing Stability: Low Risk  (3/11/2024)    Housing Stability     Do you have housing? : Yes     Are you worried about losing your housing?: No       Additional medical/Social/Surgical histories reviewed in EPIC and updated as appropriate.     REVIEW OF SYSTEMS (4/15/2024)  10 point ROS of systems including Constitutional, Eyes, Respiratory, Cardiovascular, Gastroenterology, Genitourinary, Integumentary, Musculoskeletal, Psychiatric, Allergic/Immunologic were all negative except for pertinent positives noted in my HPI.     PHYSICAL EXAM  VSS    General  - normal appearance, in no obvious distress  HEENT  - conjunctivae not injected, moist mucous membranes, normocephalic/atraumatic head, ears normal appearance, no lesions, mouth normal appearance, no scars, normal dentition and teeth present  CV  - normal radial pulse  Pulm  - normal respiratory pattern, non-labored  Musculoskeletal -  right middle finger  - inspection: no atrophy, normal joint alignment, no swelling  - palpation: no bony or soft tissue tenderness, except at base of right middle finger, no tenderness at the anatomical snuffbox  - ROM:  MCP 90 deg flexion   0 deg extension    deg flexion   0 deg extension   DIP 80 deg flexion   0  deg extension   palpable snap at the A1 pulley with active flexion, reproducible  - strength: 5/5  strength, 5/5 wrist abduction, 5/5 flexion, extension, pronation, supination, adduction  - special tests:  (-) varus  (-) valgus  Neuro  - no numbness, no motor deficit, grossly normal coordination, normal muscle tone  Skin  - no ecchymosis, erythema, warmth, or induration, no obvious rash  Psych  - interactive, appropriate, normal mood and affect   Bilateral hands:  strength 5/5, no loss of sensation, no joint pain on palpation of finger joints  ASSESSMENT & PLAN  59 yo female with bilateral hand pain, chronic, possible cervical radiculopathy, transient, and right middle finger trigger finger    I independently reviewed the following imaging studies:  Xrays hands: normal  Discussed use of voltaren gel, finger splint, given HEP with web exercises  Followup in 3 weeks consider injection if not improving  Patient has been doing home exercise physical therapy program for this problem      Appropriate PPE was utilized for prevention of spread of Covid-19.  Colten Ward MD, [facility]

## 2024-04-15 NOTE — LETTER
4/15/2024         RE: Kalani Sherwood  [address] [facility] [address]        Dear Colleague,    Thank you for referring your patient, Kalani Sherwood, to the [facility]IN[facility] [place]. Please see a copy of my visit note below.    HISTORY OF PRESENT ILLNESS  Ms. Sherwood is a pleasant 58 year old year old female who presents to clinic today with the following:  What problem are you here for? 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 2 months     Have you had any recent imaging of this problem? Xrays/MRI/CT scans? No    Have you had treatments for this problem in the past?  -Medications? Tried OTC pain meds  -Physical therapy? no  -Injections? No  -Surgery? No    How severe is this problem today? 0-10 scale? 2    How severe has this problem been at WORST in the past? 0-10 scale? 3-4    What do you think caused this problem? Works as dental hygienist     Does this problem or its symptoms cause difficulty for you falling asleep or staying asleep? No    Anything else you want us to know about this problem? Never had this before, has been a dental hygienist for ~30 yrs and has chronic pain in both hands but only 1 finger triggers at this time.            MEDICAL HISTORY  Patient Active Problem List   Diagnosis     Obesity     CARDIOVASCULAR SCREENING; LDL GOAL LESS THAN 160     Vitamin D deficiency     Anxiety attack     Recurrent major depressive disorder, in partial remission (H24)     Dry eye of left side     Posterior vitreous detachment of right eye     ADHD (attention deficit hyperactivity disorder), inattentive type       Current Outpatient Medications   Medication Sig Dispense Refill     amLODIPine (NORVASC) 10 MG tablet Take 1 tablet (10 mg) by mouth daily 90  tablet 3     amphetamine-dextroamphetamine (ADDERALL) 10 MG tablet Take one tablet twice a day. Take 1/2 to 1 tablet every evening as needed 90 tablet 0     amphetamine-dextroamphetamine (ADDERALL) 10 MG tablet Take one tablet twice a day. Take 1/2 to 1 tablet every evening as needed 90 tablet 0     [START ON 5/12/2024] amphetamine-dextroamphetamine (ADDERALL) 10 MG tablet Take one tablet twice a day. Take 1/2 to 1 tablet every evening as needed 90 tablet 0     carvedilol (COREG) 3.125 MG tablet Take 1 tablet (3.125 mg) by mouth 2 times daily 180 tablet 3     escitalopram (LEXAPRO) 20 MG tablet Take 1 tablet (20 mg) by mouth daily 90 tablet 1     guanFACINE (INTUNIV) 2 MG TB24 24 hr tablet Take 1 tablet (2 mg) by mouth at bedtime 90 tablet 1     hydrochlorothiazide (HYDRODIURIL) 25 MG tablet Take 1 tablet (25 mg) by mouth daily 90 tablet 3     oxyBUTYnin ER (DITROPAN XL) 15 MG 24 hr tablet Take 1 tablet (15 mg) by mouth daily 90 tablet 3       Allergies   Allergen Reactions     Sulfa Antibiotics      Unknown reaction as child likely an antibiotics.  Has tolerated HCTZ which is a nonarylamine sulfa drug     Triamterene      POssible Edema from triamterene         Wasp Venom Hives, Itching and Swelling       Family History   Problem Relation Age of Onset     Hypertension Mother      Depression Mother      Obesity Mother      Anxiety Disorder Mother      Diabetes Father         Adult onset     Hypertension Father      Allergies Father      Cardiovascular Father      Circulatory Father      Depression Father      Gastrointestinal Disease Father      Heart Disease Father      Lipids Father      Coronary Artery Disease Father      Hyperlipidemia Father      Anxiety Disorder Father      Diabetes Maternal Grandmother      Hypertension Maternal Grandmother      Arthritis Maternal Grandmother      Depression Maternal Grandmother      Obesity Maternal Grandmother      Osteoporosis Maternal Grandmother      Anxiety Disorder  Maternal Grandmother      Hypertension Maternal Grandfather      Arthritis Maternal Grandfather      Cancer Maternal Grandfather      Cardiovascular Maternal Grandfather      Circulatory Maternal Grandfather      Obesity Maternal Grandfather      Other Cancer Maternal Grandfather      Hypertension Paternal Grandmother      Arthritis Paternal Grandmother      Heart Disease Paternal Grandmother      Hypertension Paternal Grandfather      Arthritis Paternal Grandfather      Heart Disease Paternal Grandfather      Cerebrovascular Disease Paternal Grandfather      Depression Sister      Gynecology Sister      Obesity Sister      Anxiety Disorder Sister      Social History     Socioeconomic History     Marital status:    Tobacco Use     Smoking status: Former     Current packs/day: 0.00     Average packs/day: 0.5 packs/day for 10.0 years (5.0 ttl pk-yrs)     Types: Cigarettes     Start date: 10/2/1994     Quit date: 10/2/2004     Years since quittin.5     Smokeless tobacco: Never   Vaping Use     Vaping status: Never Used   Substance and Sexual Activity     Alcohol use: Yes     Comment: occ.     Drug use: Yes     Types: Marijuana     Sexual activity: Yes     Partners: Male     Birth control/protection: Post-menopausal, Male Surgical   Other Topics Concern     Parent/sibling w/ CABG, MI or angioplasty before 65F 55M? No     Social Determinants of Health     Financial Resource Strain: Low Risk  (3/11/2024)    Financial Resource Strain      Within the past 12 months, have you or your family members you live with been unable to get utilities (heat, electricity) when it was really needed?: No   Food Insecurity: Low Risk  (3/11/2024)    Food Insecurity      Within the past 12 months, did you worry that your food would run out before you got money to buy more?: No      Within the past 12 months, did the food you bought just not last and you didn t have money to get more?: No   Transportation Needs: Low Risk   (3/11/2024)    Transportation Needs      Within the past 12 months, has lack of transportation kept you from medical appointments, getting your medicines, non-medical meetings or appointments, work, or from getting things that you need?: No   Physical Activity: Inactive (3/11/2024)    Exercise Vital Sign      Days of Exercise per Week: 0 days      Minutes of Exercise per Session: 0 min   Stress: Stress Concern Present (3/11/2024)    [employer] - Occupational Stress Questionnaire      Feeling of Stress : Rather much   Social Connections: Unknown (3/11/2024)    Social Connection and Isolation Panel [NHANES]      Frequency of Social Gatherings with Friends and Family: Once a week   Interpersonal Safety: Low Risk  (3/11/2024)    Interpersonal Safety      Do you feel physically and emotionally safe where you currently live?: Yes      Within the past 12 months, have you been hit, slapped, kicked or otherwise physically hurt by someone?: No      Within the past 12 months, have you been humiliated or emotionally abused in other ways by your partner or ex-partner?: No   Housing Stability: Low Risk  (3/11/2024)    Housing Stability      Do you have housing? : Yes      Are you worried about losing your housing?: No       Additional medical/Social/Surgical histories reviewed in EPIC and updated as appropriate.     REVIEW OF SYSTEMS (4/15/2024)  10 point ROS of systems including Constitutional, Eyes, Respiratory, Cardiovascular, Gastroenterology, Genitourinary, Integumentary, Musculoskeletal, Psychiatric, Allergic/Immunologic were all negative except for pertinent positives noted in my HPI.     PHYSICAL EXAM  VSS    General  - normal appearance, in no obvious distress  HEENT  - conjunctivae not injected, moist mucous membranes, normocephalic/atraumatic head, ears normal appearance, no lesions, mouth normal appearance, no scars, normal dentition and teeth present  CV  - normal radial pulse  Pulm  -  normal respiratory pattern, non-labored  Musculoskeletal -  right middle finger  - inspection: no atrophy, normal joint alignment, no swelling  - palpation: no bony or soft tissue tenderness, except at base of right middle finger, no tenderness at the anatomical snuffbox  - ROM:  MCP 90 deg flexion   0 deg extension    deg flexion   0 deg extension   DIP 80 deg flexion   0 deg extension   palpable snap at the A1 pulley with active flexion, reproducible  - strength: 5/5  strength, 5/5 wrist abduction, 5/5 flexion, extension, pronation, supination, adduction  - special tests:  (-) varus  (-) valgus  Neuro  - no numbness, no motor deficit, grossly normal coordination, normal muscle tone  Skin  - no ecchymosis, erythema, warmth, or induration, no obvious rash  Psych  - interactive, appropriate, normal mood and affect   Bilateral hands:  strength 5/5, no loss of sensation, no joint pain on palpation of finger joints  ASSESSMENT & PLAN  59 yo female with bilateral hand pain, chronic, possible cervical radiculopathy, transient, and right middle finger trigger finger    I independently reviewed the following imaging studies:  Xrays hands: normal  Discussed use of voltaren gel, finger splint, given HEP with web exercises  Followup in 3 weeks consider injection if not improving  Patient has been doing home exercise physical therapy program for this problem      Appropriate PPE was utilized for prevention of spread of Covid-19.  Colten Ward MD, [facility]        Again, thank you for allowing me to participate in the care of your patient.        Sincerely,        Colten Ward MD

## 2024-05-01 NOTE — TELEPHONE ENCOUNTER
"Refill request r'cd from [facility] via fax for a 90 day supply of Guanfacine 4 mg denied this dosage has been discontinued + care returned to referring provider     Faxed \"not authorized\" back to pharmacy with reason.               PLAN:      Consult completed  Does not meet criteria for involuntary treatment or hospitalization  Trial Intuniv 3/3/2023 1 mg nightly => 3/24/23 2 mg po at bedtime efficacy without side effects, without full remission => 6/19/23 3 mg po qam further remission, effective, without side effects, worse sx with high stress => 10/9/23 4 mg po qhs => 11/20/2023 2 mg nightly efficacy for sleep and incontinence-Risks, benefits and alternatives discussed.  Patient provides verbal consent to treatment.  Add Adderall 11/20/23 10 mg p.o. twice daily efficacy, each dose lasting 6 hours perhaps, tolerated well => 10 mg po bid with 5-10 mg po qpm prn ADHD-Risks, benefits and alternatives discussed.  Patient provides verbal consent to treatment.  Advised of cardiac effects, addiction.  Follow-up blood pressure.  Agreed to discontinue cannabis use if PCP not comfortable with controlled substance and cannabis.  Restart escitalopram 4/17/23 5 mg po qday => 5/22/23 10 mg po qday bad stress/depression/anxiety => 10/2/23 20 mg po qday-Risks, benefits and alternatives discussed.  Patient provides verbal consent to treatment.  Consider escitalopram; propranolol   DC'd venlafaxine (possible contribution to HTN)  Labs -reviewed, no further labs indicated at this time  Return to PCP today  "

## 2024-05-03 DIAGNOSIS — F90.0 ADHD (ATTENTION DEFICIT HYPERACTIVITY DISORDER), INATTENTIVE TYPE: ICD-10-CM

## 2024-05-03 RX ORDER — GUANFACINE 2 MG/1
2 TABLET, EXTENDED RELEASE ORAL AT BEDTIME
Qty: 90 TABLET | Refills: 1 | OUTPATIENT
Start: 2024-05-03

## 2024-05-06 ENCOUNTER — MYC REFILL (OUTPATIENT)
Dept: PSYCHIATRY | Facility: CLINIC | Age: 59
End: 2024-05-06
Payer: COMMERCIAL

## 2024-05-06 DIAGNOSIS — F90.0 ADHD (ATTENTION DEFICIT HYPERACTIVITY DISORDER), INATTENTIVE TYPE: ICD-10-CM

## 2024-05-06 RX ORDER — DEXTROAMPHETAMINE SACCHARATE, AMPHETAMINE ASPARTATE, DEXTROAMPHETAMINE SULFATE AND AMPHETAMINE SULFATE 2.5; 2.5; 2.5; 2.5 MG/1; MG/1; MG/1; MG/1
TABLET ORAL
Qty: 90 TABLET | Refills: 0 | OUTPATIENT
Start: 2024-05-06

## 2024-05-06 RX ORDER — DEXTROAMPHETAMINE SACCHARATE, AMPHETAMINE ASPARTATE, DEXTROAMPHETAMINE SULFATE AND AMPHETAMINE SULFATE 2.5; 2.5; 2.5; 2.5 MG/1; MG/1; MG/1; MG/1
TABLET ORAL
Qty: 90 TABLET | Refills: 0 | Status: CANCELLED | OUTPATIENT
Start: 2024-05-06

## 2024-05-06 NOTE — TELEPHONE ENCOUNTER
Medication requested:  amphetamine-dextroamphetamine (ADDERALL) 10 MG  Date last ordered: 3/11/2024 Qty: 90 Refills: 2 - start dates of 4/11 and 5/12/2024 -earliest fill date 5/9/2024  Take one tablet twice a day. Take 1/2 to 1 tablet every evening as needed      Review of [place] ?: Yes  Medication last sold date: 3/15/2024 Qty filled: 90 for 1st script written on 3/11/2024  [place] on [place] ?: [place]  Filled  Written  Sold  ID  Drug  QTY  Days  Prescriber  RX #  Dispenser  Refill  Daily Dose*  Pymt Type      03/15/2024 03/11/2024 03/15/2024 1 Dextroamp-Amphetamin 10 Mg Tab 90.00 30 Mi Lisa [id number] Gra (8599) 0/0  [place] Ins [place]   02/10/2024 01/08/2024 02/12/2024 1 Dextroamp-Amphetamin 10 Mg Tab 60.00 30 Mi Lisa [id number] Gra (8599) 0/0  [place]   01/08/2024 01/08/2024 01/08/2024 1 Dextroamp-Amphetamin 10 Mg Tab 60.00 30 Mi Lisa [id number] Gra (8599) 0/0  [place]   11/24/2023 11/20/2023 11/26/2023 1 Dextroamp-Amphetamin 10 Mg Tab 60.00 30 Mi Lisa           RN sent a MyC message to the patient to contact the pharmacy directly as there should be refills on file already.    RN denied this refill request as there should be refills on file at [facility] pharmacy in [place] RN on 5/6/2024 at 11:17 AM

## 2024-05-06 NOTE — TELEPHONE ENCOUNTER
CRISTHIAN received faxed refill request for guanFACINE HCl (INTUNIV) 4 MG TB24 this medication has been Discontinued         Rosio Jacobson CMA May 6, 2024

## 2024-05-06 NOTE — TELEPHONE ENCOUNTER
Refills have been requested for the following medications:         amphetamine-dextroamphetamine (ADDERALL) 10 MG tablet [[facility]]      Patient Comment: Prescription renewals should have been taken over by Brooklyn Cam.     Preferred pharmacy: [facility] [id number] [place] [facility] - [place], [place] - [address]

## 2024-05-07 NOTE — TELEPHONE ENCOUNTER
"Called patient. Left voice message to return call at [phone].    When patient returns call, please inform that \"Dr. Coughlin did send in 3 months of this prescription on 3/11/2024. See other encounter dated today for more information  \"    Patient has a refill on file for medication. Patient also informed in other MyChart refill encounter from today that she has refill on file, patient viewed message.    Closing encounter as no further needs.    JOESPH LirianoN RN  [facility]  "

## 2024-05-20 ENCOUNTER — VIRTUAL VISIT (OUTPATIENT)
Dept: FAMILY MEDICINE | Facility: CLINIC | Age: 59
End: 2024-05-20
Payer: COMMERCIAL

## 2024-05-20 ENCOUNTER — TRANSFERRED RECORDS (OUTPATIENT)
Dept: MULTI SPECIALTY CLINIC | Facility: CLINIC | Age: 59
End: 2024-05-20

## 2024-05-20 ENCOUNTER — OFFICE VISIT (OUTPATIENT)
Dept: OPTOMETRY | Facility: CLINIC | Age: 59
End: 2024-05-20
Payer: COMMERCIAL

## 2024-05-20 DIAGNOSIS — H52.4 PRESBYOPIA: ICD-10-CM

## 2024-05-20 DIAGNOSIS — Z01.00 ENCOUNTER FOR EXAMINATION OF EYES AND VISION WITHOUT ABNORMAL FINDINGS: Primary | ICD-10-CM

## 2024-05-20 DIAGNOSIS — H52.223 REGULAR ASTIGMATISM OF BOTH EYES: ICD-10-CM

## 2024-05-20 DIAGNOSIS — H52.13 MYOPIA OF BOTH EYES: ICD-10-CM

## 2024-05-20 DIAGNOSIS — F90.0 ADHD (ATTENTION DEFICIT HYPERACTIVITY DISORDER), INATTENTIVE TYPE: ICD-10-CM

## 2024-05-20 LAB — RETINOPATHY: NORMAL

## 2024-05-20 PROCEDURE — G2211 COMPLEX E/M VISIT ADD ON: HCPCS | Mod: 95 | Performed by: PHYSICIAN ASSISTANT

## 2024-05-20 PROCEDURE — 92004 COMPRE OPH EXAM NEW PT 1/>: CPT | Performed by: OPTOMETRIST

## 2024-05-20 PROCEDURE — 99213 OFFICE O/P EST LOW 20 MIN: CPT | Mod: 95 | Performed by: PHYSICIAN ASSISTANT

## 2024-05-20 PROCEDURE — 92015 DETERMINE REFRACTIVE STATE: CPT | Performed by: OPTOMETRIST

## 2024-05-20 RX ORDER — GUANFACINE 2 MG/1
4 TABLET, EXTENDED RELEASE ORAL AT BEDTIME
Qty: 180 TABLET | Refills: 1 | Status: SHIPPED | OUTPATIENT
Start: 2024-05-20

## 2024-05-20 RX ORDER — DEXTROAMPHETAMINE SACCHARATE, AMPHETAMINE ASPARTATE, DEXTROAMPHETAMINE SULFATE AND AMPHETAMINE SULFATE 2.5; 2.5; 2.5; 2.5 MG/1; MG/1; MG/1; MG/1
10 TABLET ORAL 2 TIMES DAILY
Qty: 90 TABLET | Refills: 0 | Status: SHIPPED | OUTPATIENT
Start: 2024-07-19 | End: 2024-08-18

## 2024-05-20 RX ORDER — DEXTROAMPHETAMINE SACCHARATE, AMPHETAMINE ASPARTATE, DEXTROAMPHETAMINE SULFATE AND AMPHETAMINE SULFATE 2.5; 2.5; 2.5; 2.5 MG/1; MG/1; MG/1; MG/1
10 TABLET ORAL 2 TIMES DAILY
Qty: 90 TABLET | Refills: 0 | Status: SHIPPED | OUTPATIENT
Start: 2024-06-19 | End: 2024-07-19

## 2024-05-20 RX ORDER — DEXTROAMPHETAMINE SACCHARATE, AMPHETAMINE ASPARTATE, DEXTROAMPHETAMINE SULFATE AND AMPHETAMINE SULFATE 2.5; 2.5; 2.5; 2.5 MG/1; MG/1; MG/1; MG/1
10 TABLET ORAL 2 TIMES DAILY
Qty: 90 TABLET | Refills: 0 | Status: SHIPPED | OUTPATIENT
Start: 2024-05-20 | End: 2024-06-19

## 2024-05-20 ASSESSMENT — REFRACTION_WEARINGRX
OS_SPHERE: -1.50
OD_CYLINDER: +1.00
OD_SPHERE: -1.50
OD_AXIS: 180
OS_AXIS: 013
SPECS_TYPE: PAL
OS_CYLINDER: +1.00
OD_ADD: +2.25
OS_ADD: +2.25

## 2024-05-20 ASSESSMENT — CONF VISUAL FIELD
OD_INFERIOR_TEMPORAL_RESTRICTION: 0
OD_INFERIOR_NASAL_RESTRICTION: 0
OS_INFERIOR_TEMPORAL_RESTRICTION: 0
OS_SUPERIOR_TEMPORAL_RESTRICTION: 0
METHOD: COUNTING FINGERS
OS_INFERIOR_NASAL_RESTRICTION: 0
OD_NORMAL: 1
OS_SUPERIOR_NASAL_RESTRICTION: 0
OD_SUPERIOR_NASAL_RESTRICTION: 0
OS_NORMAL: 1
OD_SUPERIOR_TEMPORAL_RESTRICTION: 0

## 2024-05-20 ASSESSMENT — TONOMETRY
OS_IOP_MMHG: 11
OD_IOP_MMHG: 11
IOP_METHOD: APPLANATION

## 2024-05-20 ASSESSMENT — REFRACTION_MANIFEST
OS_CYLINDER: +2.00
OS_ADD: +2.50
OD_AXIS: 010
OD_SPHERE: -1.25
OS_SPHERE: -1.50
OS_AXIS: 177
OD_CYLINDER: +1.50
OD_ADD: +2.50

## 2024-05-20 ASSESSMENT — VISUAL ACUITY
OS_SC: 20/120
OS_CC+: -1
OD_SC+: -2
OS_CC: 20/60-1
OS_SC: 20/40
OD_SC: 20/120
OD_SC: 20/50
CORRECTION_TYPE: GLASSES
METHOD: SNELLEN - LINEAR
OD_CC: 20/25
OD_CC: 20/60+1
OS_CC: 20/30

## 2024-05-20 ASSESSMENT — EXTERNAL EXAM - LEFT EYE: OS_EXAM: NORMAL

## 2024-05-20 ASSESSMENT — CUP TO DISC RATIO
OS_RATIO: 0.2
OD_RATIO: 0.2

## 2024-05-20 ASSESSMENT — EXTERNAL EXAM - RIGHT EYE: OD_EXAM: NORMAL

## 2024-05-20 ASSESSMENT — SLIT LAMP EXAM - LIDS
COMMENTS: NORMAL
COMMENTS: NORMAL

## 2024-05-20 NOTE — PROGRESS NOTES
"Kalani is a 58 year old who is being evaluated via a billable video visit.    How would you like to obtain your AVS? MyChart  If the video visit is dropped, the invitation should be resent by: Send to e-mail at: [email or web address]  Will anyone else be joining your video visit? No      Assessment & Plan     ADHD (attention deficit hyperactivity disorder), inattentive type  Doing well with current medications. Discussed q 6 month visits- our goal is to get her onto BID visits so may extend refills if needed.   - guanFACINE (INTUNIV) 2 MG TB24 24 hr tablet; Take 2 tablets (4 mg) by mouth at bedtime  - amphetamine-dextroamphetamine (ADDERALL) 10 MG tablet; Take 1 tablet (10 mg) by mouth 2 times daily for 30 days Take one tablet twice a day. Take 1/2 to 1 tablet every evening as needed  - amphetamine-dextroamphetamine (ADDERALL) 10 MG tablet; Take 1 tablet (10 mg) by mouth 2 times daily for 30 days Take one tablet twice a day. Take 1/2 to 1 tablet every evening as needed  - amphetamine-dextroamphetamine (ADDERALL) 10 MG tablet; Take 1 tablet (10 mg) by mouth 2 times daily for 30 days Take one tablet twice a day. Take 1/2 to 1 tablet every evening as needed        The longitudinal plan of care for the diagnosis(es)/condition(s) as documented were addressed during this visit. Due to the added complexity in care, I will continue to support Kalani in the subsequent management and with ongoing continuity of care.  BMI  Estimated body mass index is 29.78 kg/m  as calculated from the following:    Height as of 3/11/24: 1.628 m (5' 4.09\").    Weight as of 3/11/24: 78.9 kg (174 lb).             Subjective   Kalani is a 58 year old, presenting for the following health issues:  Recheck Medication        5/20/2024     4:42 PM   Additional Questions   Roomed by camden yang     History of Present Illness       Reason for visit:  Follow up on how medication s are doing before refilling prescriptionsShe consumes 0 sweetened beverage(s) " daily.She exercises with enough effort to increase her heart rate 20 to 29 minutes per day.  She exercises with enough effort to increase her heart rate 7 days per week.   She is taking medications regularly.       Medication Followup of guanfacine and adderall  Taking Medication as prescribed: yes  Side Effects:  None  Medication Helping Symptoms:  Pt stated adderall is going well but would like to discuss more about dosage with guanfacine.      Psychiatry has released her back to my care.   Dosage for the adderall is right where they should be. Evening doses a lot for evening activities, but does skip sometimes.   The guanfacine, had played with dosages    The oxybutynin alone does not help her incontinence, however with the guanfacine it worked. 4mg worked better.   The resolution of her incontinence has been life altering.               Objective           Vitals:  No vitals were obtained today due to virtual visit.    Physical Exam   GENERAL: alert and no distress  EYES: Eyes grossly normal to inspection.  No discharge or erythema, or obvious scleral/conjunctival abnormalities.  RESP: No audible wheeze, cough, or visible cyanosis.    SKIN: Visible skin clear. No significant rash, abnormal pigmentation or lesions.  NEURO: Cranial nerves grossly intact.  Mentation and speech appropriate for age.  PSYCH: Appropriate affect, tone, and pace of words          Video-Visit Details    Type of service:  Video Visit   Originating Location (pt. Location): Home    Distant Location (provider location):  On-site  Platform used for Video Visit: Baron  Signed Electronically by: Brooklyn Cam PA-C

## 2024-05-20 NOTE — PATIENT INSTRUCTIONS
Updated glasses prescription provided today.   Allow 2 weeks to adapt to the new glasses.     Return in 2 years for a comprehensive eye exam, or sooner if needed.      The effects of the dilating drops last for 4- 6 hours.  You will be more sensitive to light and vision will be blurry up close.  Mydriatic sunglasses were given if needed.     Phillip Brothers, OD  [facility] [place]  [address] [place] [address]. [place]  [address]    [phone]

## 2024-05-20 NOTE — LETTER
5/20/2024         RE: Kalani Sherwood  [address] [facility] [address]        Dear Colleague,    Thank you for referring your patient, Kalani Sherwood, to the [facility]. Please see a copy of my visit note below.    Chief Complaint   Patient presents with     Annual Eye Exam         Last Eye Exam: ~2 yrs - [facility]   Dilated Previously: Yes, side effects of dilation explained today    What are you currently using to see?  Glasses - PAL's - wears most of the time     -wearing an old pair - her puppy chewed on her new Rx ~1 month ago     Distance Vision Acuity: Noticed gradual change in both eyes     Near Vision Acuity: Not satisfied     Eye Comfort: dry, watery, and itchy with allergies   Do you use eye drops? 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       OBJECTIVE: See Ophthalmology exam    ASSESSMENT:    ICD-10-CM    1. Encounter for examination of eyes and vision without abnormal findings  Z01.00       2. Myopia of both eyes  H52.13       3. Regular astigmatism of both eyes  H52.223       4. Presbyopia  H52.4           PLAN:     Patient Instructions   Updated glasses prescription provided today.   Allow 2 weeks to adapt to the new glasses.     Return in 2 years for a comprehensive eye exam, or sooner if needed.      The effects of the dilating drops last for 4- 6 hours.  You will be more sensitive to light and vision will be blurry up close.  Mydriatic sunglasses were given if needed.     Phillip Brothers, MERT  [address]  [address] [phone]       Again, thank you for allowing me to participate in the care of your patient.        Sincerely,        Phillip Brothers OD

## 2024-05-20 NOTE — PROGRESS NOTES
Chief Complaint   Patient presents with    Annual Eye Exam         Last Eye Exam: ~2 yrs - [facility]   Dilated Previously: Yes, side effects of dilation explained today    What are you currently using to see?  Glasses - PAL's - wears most of the time     -wearing an old pair - her puppy chewed on her new Rx ~1 month ago     Distance Vision Acuity: Noticed gradual change in both eyes     Near Vision Acuity: Not satisfied     Eye Comfort: dry, watery, and itchy with allergies   Do you use eye drops? 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       OBJECTIVE: See Ophthalmology exam    ASSESSMENT:    ICD-10-CM    1. Encounter for examination of eyes and vision without abnormal findings  Z01.00       2. Myopia of both eyes  H52.13       3. Regular astigmatism of both eyes  H52.223       4. Presbyopia  H52.4           PLAN:     Patient Instructions   Updated glasses prescription provided today.   Allow 2 weeks to adapt to the new glasses.     Return in 2 years for a comprehensive eye exam, or sooner if needed.      The effects of the dilating drops last for 4- 6 hours.  You will be more sensitive to light and vision will be blurry up close.  Mydriatic sunglasses were given if needed.     Phillip Brothers, OD  [address] [place]  [address]    [phone]

## 2024-11-21 ENCOUNTER — MYC REFILL (OUTPATIENT)
Dept: FAMILY MEDICINE | Facility: CLINIC | Age: 59
End: 2024-11-21
Payer: COMMERCIAL

## 2024-11-21 DIAGNOSIS — F90.0 ADHD (ATTENTION DEFICIT HYPERACTIVITY DISORDER), INATTENTIVE TYPE: ICD-10-CM

## 2024-11-22 RX ORDER — GUANFACINE 2 MG/1
4 TABLET, EXTENDED RELEASE ORAL AT BEDTIME
Qty: 180 TABLET | Refills: 0 | Status: SHIPPED | OUTPATIENT
Start: 2024-11-22

## 2024-11-22 NOTE — TELEPHONE ENCOUNTER
Called patient and left message to call back and schedule appointment.     Estela MAYS CMA ([facility])

## 2025-02-14 NOTE — PROGRESS NOTES
[facility]  SPORTS MEDICINE CLINIC VISIT     Feb 26, 2025      ASSESSMENT & PLAN    59-year-old 4 weeks status post right fifth metacarpal fracture.  Has done well in the splint.  Signs of clinical and radiographic healing today.    Reviewed imaging and assessment with patient in detail  Continue in splint but okay to begin weaning for simple hand therapy exercises.  Okay to remove for work if amira taping.  Would continue to wear at night for the next month as well as with high risk activities.  Follow-up in 1 month for reevaluation.  Letter written for return to work      Sage Loredo MD  [facility] SPORTS MEDICINE [facility]    -----  Chief Complaint   Patient presents with    RECHECK     Right hand fracture       SUBJECTIVE  Kalani Sherwood is a/an 59 year old female who is seen for follow up of right 5th MC fracture.     The patient is seen by themselves.    Date of injury: 1/25/25  Date of Last Visit: 2/7/25   Symptoms: improved  Worsened by: pain when swelling went down   Better with: Thumb spica splint   Treatments tried: Thumb spica splint, Ibuprofen/ tylenol  Associated symptoms: swelling        REVIEW OF SYSTEMS:  See HPI     OBJECTIVE:  LMP 09/12/2016      Right hand: Some persistent swelling over the dorsal ulnar aspect of the hand.  There is no tenderness to firm palpation in this area.  Some stiffness in flexion particularly at the PIP joint and MCP joint of the fifth finger.  Strength is intact sensation is intact.  He has some mild rotational deformity when forming composite fist    RADIOLOGY:    3 view xrays of right hand performed and reviewed independently demonstrating stable alignment of noted midshaft fifth metacarpal fracture.  Interval callus development is present.. See EMR for formal radiology report.       Cast/splint application    Date/Time: 2/26/2025 2:45 PM    Performed by: Sage Loredo MD  Authorized by: Sage Loredo MD    Consent:      Consent obtained:  Verbal    Consent given by:  Patient  Pre-procedure details:     Sensation:  Normal  Procedure details:     Laterality:  Right    Location:  Hand    Hand:  R hand    Splint type:  Short arm (static)    Supplies used: Dentalink.  Post-procedure details:     Pain:  Improved    Pain level:  0/10    Sensation:  Normal    Patient tolerance of procedure:  Tolerated well, no immediate complications    Patient provided with cast or splint care instructions: Yes

## 2025-02-20 ASSESSMENT — PATIENT HEALTH QUESTIONNAIRE - PHQ9: SUM OF ALL RESPONSES TO PHQ QUESTIONS 1-9: 8

## 2025-02-21 ASSESSMENT — PATIENT HEALTH QUESTIONNAIRE - PHQ9
SUM OF ALL RESPONSES TO PHQ QUESTIONS 1-9: 8
10. IF YOU CHECKED OFF ANY PROBLEMS, HOW DIFFICULT HAVE THESE PROBLEMS MADE IT FOR YOU TO DO YOUR WORK, TAKE CARE OF THINGS AT HOME, OR GET ALONG WITH OTHER PEOPLE: SOMEWHAT DIFFICULT

## 2025-02-25 ENCOUNTER — OFFICE VISIT (OUTPATIENT)
Dept: FAMILY MEDICINE | Facility: CLINIC | Age: 60
End: 2025-02-25
Payer: COMMERCIAL

## 2025-02-25 VITALS
DIASTOLIC BLOOD PRESSURE: 88 MMHG | BODY MASS INDEX: 30.56 KG/M2 | SYSTOLIC BLOOD PRESSURE: 162 MMHG | WEIGHT: 179 LBS | HEART RATE: 63 BPM | TEMPERATURE: 99.1 F | RESPIRATION RATE: 18 BRPM | OXYGEN SATURATION: 96 % | HEIGHT: 64 IN

## 2025-02-25 DIAGNOSIS — E87.6 HYPOKALEMIA: ICD-10-CM

## 2025-02-25 DIAGNOSIS — Z13.220 SCREENING FOR HYPERLIPIDEMIA: ICD-10-CM

## 2025-02-25 DIAGNOSIS — F90.0 ADHD (ATTENTION DEFICIT HYPERACTIVITY DISORDER), INATTENTIVE TYPE: ICD-10-CM

## 2025-02-25 DIAGNOSIS — R32 URINARY INCONTINENCE, UNSPECIFIED TYPE: ICD-10-CM

## 2025-02-25 DIAGNOSIS — F33.0 MAJOR DEPRESSIVE DISORDER, RECURRENT EPISODE, MILD: ICD-10-CM

## 2025-02-25 DIAGNOSIS — I10 ESSENTIAL HYPERTENSION WITH GOAL BLOOD PRESSURE LESS THAN 130/80: Primary | ICD-10-CM

## 2025-02-25 DIAGNOSIS — R73.09 ELEVATED GLUCOSE: ICD-10-CM

## 2025-02-25 LAB
ALBUMIN SERPL BCG-MCNC: 4.1 G/DL (ref 3.5–5.2)
ALP SERPL-CCNC: 59 U/L (ref 40–150)
ALT SERPL W P-5'-P-CCNC: 12 U/L (ref 0–50)
ANION GAP SERPL CALCULATED.3IONS-SCNC: 12 MMOL/L (ref 7–15)
AST SERPL W P-5'-P-CCNC: 20 U/L (ref 0–45)
BILIRUB SERPL-MCNC: 0.7 MG/DL
BUN SERPL-MCNC: 12 MG/DL (ref 6–20)
CALCIUM SERPL-MCNC: 8.7 MG/DL (ref 8.8–10.4)
CHLORIDE SERPL-SCNC: 100 MMOL/L (ref 98–107)
CHOLEST SERPL-MCNC: 197 MG/DL
CREAT SERPL-MCNC: 0.62 MG/DL (ref 0.51–0.95)
EGFRCR SERPLBLD CKD-EPI 2021: >90 ML/MIN/1.73M2
EST. AVERAGE GLUCOSE BLD GHB EST-MCNC: 117 MG/DL
FASTING STATUS PATIENT QL REPORTED: YES
FASTING STATUS PATIENT QL REPORTED: YES
GLUCOSE SERPL-MCNC: 132 MG/DL (ref 70–99)
HBA1C MFR BLD: 5.7 % (ref 0–5.6)
HCO3 SERPL-SCNC: 29 MMOL/L (ref 22–29)
HDLC SERPL-MCNC: 71 MG/DL
LDLC SERPL CALC-MCNC: 107 MG/DL
NONHDLC SERPL-MCNC: 126 MG/DL
POTASSIUM SERPL-SCNC: 2.8 MMOL/L (ref 3.4–5.3)
PROT SERPL-MCNC: 6.7 G/DL (ref 6.4–8.3)
SODIUM SERPL-SCNC: 141 MMOL/L (ref 135–145)
TRIGL SERPL-MCNC: 97 MG/DL

## 2025-02-25 PROCEDURE — 80053 COMPREHEN METABOLIC PANEL: CPT | Performed by: PHYSICIAN ASSISTANT

## 2025-02-25 PROCEDURE — 80061 LIPID PANEL: CPT | Performed by: PHYSICIAN ASSISTANT

## 2025-02-25 PROCEDURE — G2211 COMPLEX E/M VISIT ADD ON: HCPCS | Performed by: PHYSICIAN ASSISTANT

## 2025-02-25 PROCEDURE — 36415 COLL VENOUS BLD VENIPUNCTURE: CPT | Performed by: PHYSICIAN ASSISTANT

## 2025-02-25 PROCEDURE — 83036 HEMOGLOBIN GLYCOSYLATED A1C: CPT | Performed by: PHYSICIAN ASSISTANT

## 2025-02-25 PROCEDURE — 99214 OFFICE O/P EST MOD 30 MIN: CPT | Performed by: PHYSICIAN ASSISTANT

## 2025-02-25 RX ORDER — DEXTROAMPHETAMINE SACCHARATE, AMPHETAMINE ASPARTATE, DEXTROAMPHETAMINE SULFATE AND AMPHETAMINE SULFATE 2.5; 2.5; 2.5; 2.5 MG/1; MG/1; MG/1; MG/1
10 TABLET ORAL 2 TIMES DAILY
Qty: 90 TABLET | Refills: 0 | Status: SHIPPED | OUTPATIENT
Start: 2025-02-25

## 2025-02-25 RX ORDER — DEXTROAMPHETAMINE SACCHARATE, AMPHETAMINE ASPARTATE, DEXTROAMPHETAMINE SULFATE AND AMPHETAMINE SULFATE 2.5; 2.5; 2.5; 2.5 MG/1; MG/1; MG/1; MG/1
TABLET ORAL
Qty: 90 TABLET | Refills: 0 | Status: SHIPPED | OUTPATIENT
Start: 2025-03-24

## 2025-02-25 RX ORDER — HYDROCHLOROTHIAZIDE 25 MG/1
25 TABLET ORAL DAILY
Qty: 90 TABLET | Refills: 3 | Status: SHIPPED | OUTPATIENT
Start: 2025-02-25

## 2025-02-25 RX ORDER — GUANFACINE 4 MG/1
4 TABLET, EXTENDED RELEASE ORAL AT BEDTIME
Qty: 90 TABLET | Refills: 3 | Status: SHIPPED | OUTPATIENT
Start: 2025-02-25

## 2025-02-25 RX ORDER — OXYBUTYNIN CHLORIDE 15 MG/1
15 TABLET, EXTENDED RELEASE ORAL DAILY
Qty: 90 TABLET | Refills: 3 | Status: SHIPPED | OUTPATIENT
Start: 2025-02-25

## 2025-02-25 RX ORDER — DEXTROAMPHETAMINE SACCHARATE, AMPHETAMINE ASPARTATE, DEXTROAMPHETAMINE SULFATE AND AMPHETAMINE SULFATE 2.5; 2.5; 2.5; 2.5 MG/1; MG/1; MG/1; MG/1
TABLET ORAL
Qty: 90 TABLET | Refills: 0 | Status: SHIPPED | OUTPATIENT
Start: 2025-04-24

## 2025-02-25 RX ORDER — GUANFACINE 2 MG/1
4 TABLET, EXTENDED RELEASE ORAL AT BEDTIME
Qty: 180 TABLET | Refills: 0 | Status: CANCELLED | OUTPATIENT
Start: 2025-02-25

## 2025-02-25 RX ORDER — AMLODIPINE BESYLATE 10 MG/1
10 TABLET ORAL DAILY
Qty: 90 TABLET | Refills: 3 | Status: SHIPPED | OUTPATIENT
Start: 2025-02-25

## 2025-02-25 RX ORDER — CARVEDILOL 3.12 MG/1
3.12 TABLET ORAL 2 TIMES DAILY
Qty: 180 TABLET | Refills: 3 | Status: SHIPPED | OUTPATIENT
Start: 2025-02-25

## 2025-02-25 RX ORDER — ESCITALOPRAM OXALATE 20 MG/1
20 TABLET ORAL DAILY
Qty: 90 TABLET | Refills: 1 | Status: SHIPPED | OUTPATIENT
Start: 2025-02-25

## 2025-02-25 NOTE — PATIENT INSTRUCTIONS
Send Brooklyn a Click With Me Nowhart message in about a week with home blood pressure readings.     140/90- blood pressure should be below this.       Schedule mammogram when you have time.

## 2025-02-25 NOTE — PROGRESS NOTES
Assessment & Plan       Essential hypertension with goal blood pressure less than 130/80  Not controlled. Patient to send [employer] message with home blood pressure readings.   - amLODIPine (NORVASC) 10 MG tablet; Take 1 tablet (10 mg) by mouth daily.  - carvedilol (COREG) 3.125 MG tablet; Take 1 tablet (3.125 mg) by mouth 2 times daily.  - hydrochlorothiazide (HYDRODIURIL) 25 MG tablet; Take 1 tablet (25 mg) by mouth daily.  - Comprehensive metabolic panel; Future  - Comprehensive metabolic panel    ADHD (attention deficit hyperactivity disorder), inattentive type  Stable. Will refill. Continue every 6 month visits.   - amphetamine-dextroamphetamine (ADDERALL) 10 MG tablet; Take 1 tablet (10 mg) by mouth 2 times daily. Take one tablet twice a day. Take 1/2 to 1 tablet every evening as needed  - guanFACINE HCl (INTUNIV) 4 MG TB24; Take 1 tablet (4 mg) by mouth at bedtime.  - amphetamine-dextroamphetamine (ADDERALL) 10 MG tablet; Take one tablet twice a day. Take 1/2 to 1 tablet every evening as needed  - amphetamine-dextroamphetamine (ADDERALL) 10 MG tablet; Take one tablet twice a day. Take 1/2 to 1 tablet every evening as needed    Major depressive disorder, recurrent episode, mild  Stable. Due for refill.   - escitalopram (LEXAPRO) 20 MG tablet; Take 1 tablet (20 mg) by mouth daily.    Urinary incontinence, unspecified type  Improved on medication. Refilled.   - oxyBUTYnin ER (DITROPAN XL) 15 MG 24 hr tablet; Take 1 tablet (15 mg) by mouth daily.    Screening for hyperlipidemia  - Lipid panel reflex to direct LDL Non-fasting; Future  - Lipid panel reflex to direct LDL Non-fasting    Elevated glucose  - Hemoglobin A1c; Future  - Hemoglobin A1c      The longitudinal plan of care for the diagnosis(es)/condition(s) as documented were addressed during this visit. Due to the added complexity in care, I will continue to support Kalani in the subsequent management and with ongoing continuity of care.    BMI  Estimated  "body mass index is 30.64 kg/m  as calculated from the following:    Height as of this encounter: 1.628 m (5' 4.09\").    Weight as of this encounter: 81.2 kg (179 lb).             Britney Uriarte is a 59 year old, presenting for the following health issues:  Recheck Medication      2/25/2025    10:44 AM   Additional Questions   Roomed by Padma     History of Present Illness       Reason for visit:  Time to renew prescriptions.   She is taking medications regularly.       She uses the 3rd dose 3-4 nights a week of her Adderall.   Out of work right now due to arm injury. Sees ortho for clearance this week.     Did not take blood pressure meds early today.                     Objective    BP (!) 164/88 (BP Location: Left arm, Patient Position: Sitting, Cuff Size: Adult Large)   Pulse 63   Temp 99.1  F (37.3  C) (Temporal)   Resp 18   Ht 1.628 m (5' 4.09\")   Wt 81.2 kg (179 lb)   LMP 09/12/2016   SpO2 96%   BMI 30.64 kg/m    Body mass index is 30.64 kg/m .  Physical Exam   GENERAL: alert and no distress  RESP: lungs clear to auscultation - no rales, rhonchi or wheezes  CV: regular rate and rhythm, normal S1 S2, no S3 or S4, no murmur, click or rub, no peripheral edema   PSYCH: mentation appears normal, affect normal/bright            Signed Electronically by: Brooklyn Cam PA-C    "

## 2025-02-26 ENCOUNTER — OFFICE VISIT (OUTPATIENT)
Dept: ORTHOPEDICS | Facility: CLINIC | Age: 60
End: 2025-02-26
Payer: COMMERCIAL

## 2025-02-26 DIAGNOSIS — S62.306A CLOSED DISPLACED FRACTURE OF FIFTH METACARPAL BONE OF RIGHT HAND, INITIAL ENCOUNTER: Primary | ICD-10-CM

## 2025-02-26 PROCEDURE — 29125 APPL SHORT ARM SPLINT STATIC: CPT | Mod: RT | Performed by: FAMILY MEDICINE

## 2025-02-26 PROCEDURE — 99213 OFFICE O/P EST LOW 20 MIN: CPT | Mod: 25 | Performed by: FAMILY MEDICINE

## 2025-02-26 NOTE — LETTER
2/26/2025      Kalani Sherwood  [address] [facility] [address]      Dear Colleague,    Thank you for referring your patient, Kalani Sherwood, to the [facility]. Please see a copy of my visit note below.      [facility]  SPORTS MEDICINE CLINIC VISIT     Feb 26, 2025      ASSESSMENT & PLAN    59-year-old 4 weeks status post right fifth metacarpal fracture.  Has done well in the splint.  Signs of clinical and radiographic healing today.    Reviewed imaging and assessment with patient in detail  Continue in splint but okay to begin weaning for simple hand therapy exercises.  Okay to remove for work if amira taping.  Would continue to wear at night for the next month as well as with high risk activities.  Follow-up in 1 month for reevaluation.  Letter written for return to work      Sage Loredo MD  [facility]    -----  Chief Complaint   Patient presents with     RECHECK     Right hand fracture       SUBJECTIVE  Kalani Sherwood is a/an 59 year old female who is seen for follow up of right 5th MC fracture.     The patient is seen by themselves.    Date of injury: 1/25/25  Date of Last Visit: 2/7/25   Symptoms: improved  Worsened by: pain when swelling went down   Better with: Thumb spica splint   Treatments tried: Thumb spica splint, Ibuprofen/ tylenol  Associated symptoms: swelling        REVIEW OF SYSTEMS:  See HPI     OBJECTIVE:  LMP 09/12/2016      Right hand: Some persistent swelling over the dorsal ulnar aspect of the hand.  There is no tenderness to firm palpation in this area.  Some stiffness in flexion particularly at the PIP joint and MCP joint of the fifth finger.  Strength is intact sensation is intact.  He has some mild rotational deformity when forming composite fist    RADIOLOGY:    3 view xrays of right hand performed and reviewed independently demonstrating stable alignment of noted  midshaft fifth metacarpal fracture.  Interval callus development is present.. See EMR for formal radiology report.       Cast/splint application    Date/Time: 2/26/2025 2:45 PM    Performed by: Sage Loredo MD  Authorized by: Sage Loredo MD    Consent:     Consent obtained:  Verbal    Consent given by:  Patient  Pre-procedure details:     Sensation:  Normal  Procedure details:     Laterality:  Right    Location:  Hand    Hand:  R hand    Splint type:  Short arm (static)    Supplies used: [employer].  Post-procedure details:     Pain:  Improved    Pain level:  0/10    Sensation:  Normal    Patient tolerance of procedure:  Tolerated well, no immediate complications    Patient provided with cast or splint care instructions: Yes          Again, thank you for allowing me to participate in the care of your patient.        Sincerely,        Sage Loredo MD    Electronically signed

## 2025-02-26 NOTE — LETTER
2025    Kalani Sherwood   1965        To whom it may concern:    Kalani Sherwood is under my professional care for a right hand injury and may return to work on 25 with the following restrictions:     -recommend no more than 4 hrs of work per day  -ok to amira tape fingers while at work if possible    Restrictions to remain in place for two weeks. Ok to return to full duty without restriction at that time unless further notified.        Please contact our office with questions or concerns.     Sincerely,        Sage Loredo MD

## 2025-02-27 NOTE — RESULT ENCOUNTER NOTE
Kalani,     Your potassium level was pretty low when we checked it. I would like to double check this on Monday or Tuesday next week. You can work on increasing potassium rich foods in your diet as well. Your sugar level was high when we checked it. If you were fasting more than 8 hours I have concerns about diabetes. If you had ate or drank anything then we are looking at pre-diabetes. Please let me know so we can decide next steps.   Brooklyn Cam PA-C

## 2025-04-20 ENCOUNTER — HEALTH MAINTENANCE LETTER (OUTPATIENT)
Age: 60
End: 2025-04-20

## 2025-05-11 ENCOUNTER — HEALTH MAINTENANCE LETTER (OUTPATIENT)
Age: 60
End: 2025-05-11

## 2025-05-27 DIAGNOSIS — F33.0 MAJOR DEPRESSIVE DISORDER, RECURRENT EPISODE, MILD: ICD-10-CM

## 2025-05-27 RX ORDER — ESCITALOPRAM OXALATE 20 MG/1
20 TABLET ORAL DAILY
Qty: 90 TABLET | Refills: 1 | Status: SHIPPED | OUTPATIENT
Start: 2025-05-27